# Patient Record
Sex: MALE | Race: WHITE | Employment: OTHER | URBAN - METROPOLITAN AREA
[De-identification: names, ages, dates, MRNs, and addresses within clinical notes are randomized per-mention and may not be internally consistent; named-entity substitution may affect disease eponyms.]

---

## 2023-02-01 ENCOUNTER — APPOINTMENT (OUTPATIENT)
Dept: CT IMAGING | Age: 78
DRG: 390 | End: 2023-02-01
Payer: COMMERCIAL

## 2023-02-01 ENCOUNTER — HOSPITAL ENCOUNTER (INPATIENT)
Age: 78
LOS: 4 days | Discharge: HOME | DRG: 390 | End: 2023-02-05
Attending: EMERGENCY MEDICINE | Admitting: INTERNAL MEDICINE
Payer: COMMERCIAL

## 2023-02-01 ENCOUNTER — APPOINTMENT (OUTPATIENT)
Dept: GENERAL RADIOLOGY | Age: 78
DRG: 390 | End: 2023-02-01
Payer: COMMERCIAL

## 2023-02-01 DIAGNOSIS — K56.609 SBO (SMALL BOWEL OBSTRUCTION) (HCC): Primary | ICD-10-CM

## 2023-02-01 DIAGNOSIS — R11.2 NAUSEA AND VOMITING, UNSPECIFIED VOMITING TYPE: ICD-10-CM

## 2023-02-01 LAB
A/G RATIO: 1.8 (ref 1.1–2.2)
ALBUMIN SERPL-MCNC: 4.6 G/DL (ref 3.4–5)
ALP BLD-CCNC: 56 U/L (ref 40–129)
ALT SERPL-CCNC: 17 U/L (ref 10–40)
ANION GAP SERPL CALCULATED.3IONS-SCNC: 15 MMOL/L (ref 3–16)
AST SERPL-CCNC: 19 U/L (ref 15–37)
BASOPHILS ABSOLUTE: 0 K/UL (ref 0–0.2)
BASOPHILS RELATIVE PERCENT: 0.1 %
BILIRUB SERPL-MCNC: 0.8 MG/DL (ref 0–1)
BILIRUBIN URINE: NEGATIVE
BLOOD, URINE: ABNORMAL
BUN BLDV-MCNC: 25 MG/DL (ref 7–20)
CALCIUM SERPL-MCNC: 9.7 MG/DL (ref 8.3–10.6)
CHLORIDE BLD-SCNC: 97 MMOL/L (ref 99–110)
CLARITY: CLEAR
CO2: 24 MMOL/L (ref 21–32)
COLOR: YELLOW
CREAT SERPL-MCNC: 1.2 MG/DL (ref 0.8–1.3)
EOSINOPHILS ABSOLUTE: 0 K/UL (ref 0–0.6)
EOSINOPHILS RELATIVE PERCENT: 0 %
GFR SERPL CREATININE-BSD FRML MDRD: >60 ML/MIN/{1.73_M2}
GLUCOSE BLD-MCNC: 156 MG/DL (ref 70–99)
GLUCOSE BLD-MCNC: 166 MG/DL (ref 70–99)
GLUCOSE URINE: NEGATIVE MG/DL
HCT VFR BLD CALC: 42.2 % (ref 40.5–52.5)
HEMOGLOBIN: 13.7 G/DL (ref 13.5–17.5)
KETONES, URINE: NEGATIVE MG/DL
LEUKOCYTE ESTERASE, URINE: NEGATIVE
LIPASE: 65 U/L (ref 13–60)
LYMPHOCYTES ABSOLUTE: 0.3 K/UL (ref 1–5.1)
LYMPHOCYTES RELATIVE PERCENT: 3.2 %
MAGNESIUM: 2 MG/DL (ref 1.8–2.4)
MCH RBC QN AUTO: 29 PG (ref 26–34)
MCHC RBC AUTO-ENTMCNC: 32.4 G/DL (ref 31–36)
MCV RBC AUTO: 89.5 FL (ref 80–100)
MICROSCOPIC EXAMINATION: YES
MONOCYTES ABSOLUTE: 0.3 K/UL (ref 0–1.3)
MONOCYTES RELATIVE PERCENT: 3.3 %
NEUTROPHILS ABSOLUTE: 8.8 K/UL (ref 1.7–7.7)
NEUTROPHILS RELATIVE PERCENT: 93.4 %
NITRITE, URINE: NEGATIVE
PDW BLD-RTO: 14.6 % (ref 12.4–15.4)
PERFORMED ON: ABNORMAL
PH UA: 5.5 (ref 5–8)
PLATELET # BLD: 236 K/UL (ref 135–450)
PMV BLD AUTO: 7.8 FL (ref 5–10.5)
POTASSIUM REFLEX MAGNESIUM: 3.4 MMOL/L (ref 3.5–5.1)
PROTEIN UA: 30 MG/DL
RBC # BLD: 4.71 M/UL (ref 4.2–5.9)
RBC UA: NORMAL /HPF (ref 0–4)
SODIUM BLD-SCNC: 136 MMOL/L (ref 136–145)
SPECIFIC GRAVITY UA: 1.01 (ref 1–1.03)
TOTAL PROTEIN: 7.1 G/DL (ref 6.4–8.2)
URINE REFLEX TO CULTURE: ABNORMAL
URINE TYPE: ABNORMAL
UROBILINOGEN, URINE: 0.2 E.U./DL
WBC # BLD: 9.4 K/UL (ref 4–11)
WBC UA: NORMAL /HPF (ref 0–5)

## 2023-02-01 PROCEDURE — 2500000003 HC RX 250 WO HCPCS: Performed by: EMERGENCY MEDICINE

## 2023-02-01 PROCEDURE — 80053 COMPREHEN METABOLIC PANEL: CPT

## 2023-02-01 PROCEDURE — 1200000000 HC SEMI PRIVATE

## 2023-02-01 PROCEDURE — 2580000003 HC RX 258: Performed by: PHYSICIAN ASSISTANT

## 2023-02-01 PROCEDURE — 74177 CT ABD & PELVIS W/CONTRAST: CPT

## 2023-02-01 PROCEDURE — 96374 THER/PROPH/DIAG INJ IV PUSH: CPT

## 2023-02-01 PROCEDURE — 2580000003 HC RX 258: Performed by: NURSE PRACTITIONER

## 2023-02-01 PROCEDURE — 83690 ASSAY OF LIPASE: CPT

## 2023-02-01 PROCEDURE — 93005 ELECTROCARDIOGRAM TRACING: CPT | Performed by: EMERGENCY MEDICINE

## 2023-02-01 PROCEDURE — 6360000002 HC RX W HCPCS: Performed by: EMERGENCY MEDICINE

## 2023-02-01 PROCEDURE — 96375 TX/PRO/DX INJ NEW DRUG ADDON: CPT

## 2023-02-01 PROCEDURE — 2500000003 HC RX 250 WO HCPCS: Performed by: NURSE PRACTITIONER

## 2023-02-01 PROCEDURE — 74018 RADEX ABDOMEN 1 VIEW: CPT

## 2023-02-01 PROCEDURE — 83735 ASSAY OF MAGNESIUM: CPT

## 2023-02-01 PROCEDURE — 81001 URINALYSIS AUTO W/SCOPE: CPT

## 2023-02-01 PROCEDURE — 6360000002 HC RX W HCPCS: Performed by: PHYSICIAN ASSISTANT

## 2023-02-01 PROCEDURE — 6360000002 HC RX W HCPCS: Performed by: NURSE PRACTITIONER

## 2023-02-01 PROCEDURE — 6360000004 HC RX CONTRAST MEDICATION: Performed by: PHYSICIAN ASSISTANT

## 2023-02-01 PROCEDURE — 2580000003 HC RX 258: Performed by: EMERGENCY MEDICINE

## 2023-02-01 PROCEDURE — 85025 COMPLETE CBC W/AUTO DIFF WBC: CPT

## 2023-02-01 PROCEDURE — 99285 EMERGENCY DEPT VISIT HI MDM: CPT

## 2023-02-01 RX ORDER — ACETAMINOPHEN 650 MG/1
650 SUPPOSITORY RECTAL EVERY 6 HOURS PRN
Status: DISCONTINUED | OUTPATIENT
Start: 2023-02-01 | End: 2023-02-05 | Stop reason: HOSPADM

## 2023-02-01 RX ORDER — SODIUM CHLORIDE 0.9 % (FLUSH) 0.9 %
5-40 SYRINGE (ML) INJECTION EVERY 12 HOURS SCHEDULED
Status: DISCONTINUED | OUTPATIENT
Start: 2023-02-01 | End: 2023-02-05 | Stop reason: HOSPADM

## 2023-02-01 RX ORDER — 0.9 % SODIUM CHLORIDE 0.9 %
1000 INTRAVENOUS SOLUTION INTRAVENOUS ONCE
Status: COMPLETED | OUTPATIENT
Start: 2023-02-01 | End: 2023-02-02

## 2023-02-01 RX ORDER — SILDENAFIL 100 MG/1
TABLET, FILM COATED ORAL
COMMUNITY
Start: 2022-12-05

## 2023-02-01 RX ORDER — INSULIN LISPRO 100 [IU]/ML
0-4 INJECTION, SOLUTION INTRAVENOUS; SUBCUTANEOUS EVERY 4 HOURS
Status: DISCONTINUED | OUTPATIENT
Start: 2023-02-01 | End: 2023-02-04

## 2023-02-01 RX ORDER — ONDANSETRON 2 MG/ML
4 INJECTION INTRAMUSCULAR; INTRAVENOUS ONCE
Status: COMPLETED | OUTPATIENT
Start: 2023-02-01 | End: 2023-02-01

## 2023-02-01 RX ORDER — DOXAZOSIN 8 MG/1
TABLET ORAL
COMMUNITY
Start: 2023-01-20

## 2023-02-01 RX ORDER — SODIUM CHLORIDE 9 MG/ML
INJECTION, SOLUTION INTRAVENOUS PRN
Status: DISCONTINUED | OUTPATIENT
Start: 2023-02-01 | End: 2023-02-05 | Stop reason: HOSPADM

## 2023-02-01 RX ORDER — FLUTICASONE PROPIONATE 50 MCG
1 SPRAY, SUSPENSION (ML) NASAL DAILY PRN
COMMUNITY

## 2023-02-01 RX ORDER — VALSARTAN 320 MG/1
TABLET ORAL
COMMUNITY
Start: 2022-09-06

## 2023-02-01 RX ORDER — SODIUM CHLORIDE AND POTASSIUM CHLORIDE 300; 900 MG/100ML; MG/100ML
INJECTION, SOLUTION INTRAVENOUS CONTINUOUS
Status: DISCONTINUED | OUTPATIENT
Start: 2023-02-01 | End: 2023-02-03

## 2023-02-01 RX ORDER — MORPHINE SULFATE 4 MG/ML
4 INJECTION, SOLUTION INTRAMUSCULAR; INTRAVENOUS ONCE
Status: COMPLETED | OUTPATIENT
Start: 2023-02-01 | End: 2023-02-01

## 2023-02-01 RX ORDER — ACETAMINOPHEN 325 MG/1
650 TABLET ORAL EVERY 6 HOURS PRN
Status: DISCONTINUED | OUTPATIENT
Start: 2023-02-01 | End: 2023-02-05 | Stop reason: HOSPADM

## 2023-02-01 RX ORDER — SODIUM CHLORIDE 0.9 % (FLUSH) 0.9 %
5-40 SYRINGE (ML) INJECTION PRN
Status: DISCONTINUED | OUTPATIENT
Start: 2023-02-01 | End: 2023-02-05 | Stop reason: HOSPADM

## 2023-02-01 RX ORDER — DEXTROSE MONOHYDRATE 100 MG/ML
INJECTION, SOLUTION INTRAVENOUS CONTINUOUS PRN
Status: DISCONTINUED | OUTPATIENT
Start: 2023-02-01 | End: 2023-02-05 | Stop reason: HOSPADM

## 2023-02-01 RX ORDER — BLOOD SUGAR DIAGNOSTIC
STRIP MISCELLANEOUS
COMMUNITY
Start: 2023-01-20

## 2023-02-01 RX ORDER — LEVOTHYROXINE SODIUM 0.12 MG/1
TABLET ORAL
COMMUNITY
Start: 2023-01-05

## 2023-02-01 RX ORDER — HYDROCHLOROTHIAZIDE 25 MG/1
TABLET ORAL
COMMUNITY
Start: 2022-09-06

## 2023-02-01 RX ORDER — LABETALOL HYDROCHLORIDE 5 MG/ML
20 INJECTION, SOLUTION INTRAVENOUS ONCE
Status: COMPLETED | OUTPATIENT
Start: 2023-02-01 | End: 2023-02-01

## 2023-02-01 RX ORDER — DILTIAZEM HYDROCHLORIDE 240 MG/1
240 CAPSULE, COATED, EXTENDED RELEASE ORAL DAILY
Status: DISCONTINUED | OUTPATIENT
Start: 2023-02-02 | End: 2023-02-03

## 2023-02-01 RX ORDER — VALSARTAN 80 MG/1
320 TABLET ORAL DAILY
Status: DISCONTINUED | OUTPATIENT
Start: 2023-02-02 | End: 2023-02-05 | Stop reason: HOSPADM

## 2023-02-01 RX ORDER — DOXAZOSIN 2 MG/1
8 TABLET ORAL DAILY
Status: DISCONTINUED | OUTPATIENT
Start: 2023-02-02 | End: 2023-02-03

## 2023-02-01 RX ORDER — LABETALOL HYDROCHLORIDE 5 MG/ML
10 INJECTION, SOLUTION INTRAVENOUS EVERY 4 HOURS PRN
Status: DISCONTINUED | OUTPATIENT
Start: 2023-02-01 | End: 2023-02-05 | Stop reason: HOSPADM

## 2023-02-01 RX ORDER — DILTIAZEM HYDROCHLORIDE 180 MG/1
240 CAPSULE, EXTENDED RELEASE ORAL DAILY
Status: ON HOLD | COMMUNITY
Start: 2018-07-19 | End: 2023-02-03 | Stop reason: CLARIF

## 2023-02-01 RX ORDER — POLYETHYLENE GLYCOL 3350 17 G/17G
17 POWDER, FOR SOLUTION ORAL DAILY PRN
Status: DISCONTINUED | OUTPATIENT
Start: 2023-02-01 | End: 2023-02-05 | Stop reason: HOSPADM

## 2023-02-01 RX ORDER — 0.9 % SODIUM CHLORIDE 0.9 %
1000 INTRAVENOUS SOLUTION INTRAVENOUS ONCE
Status: COMPLETED | OUTPATIENT
Start: 2023-02-01 | End: 2023-02-01

## 2023-02-01 RX ORDER — PROCHLORPERAZINE EDISYLATE 5 MG/ML
10 INJECTION INTRAMUSCULAR; INTRAVENOUS EVERY 6 HOURS PRN
Status: DISCONTINUED | OUTPATIENT
Start: 2023-02-01 | End: 2023-02-05 | Stop reason: HOSPADM

## 2023-02-01 RX ORDER — POTASSIUM CHLORIDE 20 MEQ/1
TABLET, EXTENDED RELEASE ORAL
COMMUNITY
Start: 2010-10-06

## 2023-02-01 RX ORDER — ASPIRIN 81 MG/1
81 TABLET ORAL DAILY
COMMUNITY

## 2023-02-01 RX ORDER — LEVOTHYROXINE SODIUM 0.12 MG/1
125 TABLET ORAL DAILY
Status: DISCONTINUED | OUTPATIENT
Start: 2023-02-02 | End: 2023-02-05 | Stop reason: HOSPADM

## 2023-02-01 RX ADMIN — IOPAMIDOL 75 ML: 755 INJECTION, SOLUTION INTRAVENOUS at 18:56

## 2023-02-01 RX ADMIN — SODIUM CHLORIDE 1000 ML: 9 INJECTION, SOLUTION INTRAVENOUS at 21:02

## 2023-02-01 RX ADMIN — SODIUM CHLORIDE, PRESERVATIVE FREE 10 ML: 5 INJECTION INTRAVENOUS at 23:16

## 2023-02-01 RX ADMIN — POTASSIUM CHLORIDE AND SODIUM CHLORIDE: 900; 300 INJECTION, SOLUTION INTRAVENOUS at 23:30

## 2023-02-01 RX ADMIN — ONDANSETRON 4 MG: 2 INJECTION INTRAMUSCULAR; INTRAVENOUS at 20:56

## 2023-02-01 RX ADMIN — HYDROMORPHONE HYDROCHLORIDE 0.5 MG: 0.5 INJECTION, SOLUTION INTRAMUSCULAR; INTRAVENOUS; SUBCUTANEOUS at 20:57

## 2023-02-01 RX ADMIN — HYDROMORPHONE HYDROCHLORIDE 0.5 MG: 1 INJECTION, SOLUTION INTRAMUSCULAR; INTRAVENOUS; SUBCUTANEOUS at 23:15

## 2023-02-01 RX ADMIN — LABETALOL HYDROCHLORIDE 10 MG: 5 INJECTION INTRAVENOUS at 23:20

## 2023-02-01 RX ADMIN — MORPHINE SULFATE 4 MG: 4 INJECTION, SOLUTION INTRAMUSCULAR; INTRAVENOUS at 18:46

## 2023-02-01 RX ADMIN — Medication 10 ML: at 23:21

## 2023-02-01 RX ADMIN — SODIUM CHLORIDE 1000 ML: 9 INJECTION, SOLUTION INTRAVENOUS at 18:13

## 2023-02-01 RX ADMIN — LABETALOL HYDROCHLORIDE 20 MG: 5 INJECTION INTRAVENOUS at 20:55

## 2023-02-01 ASSESSMENT — PAIN DESCRIPTION - ORIENTATION
ORIENTATION: RIGHT;LEFT;MID
ORIENTATION: MID
ORIENTATION: RIGHT;LEFT;MID

## 2023-02-01 ASSESSMENT — PAIN DESCRIPTION - LOCATION
LOCATION: ABDOMEN

## 2023-02-01 ASSESSMENT — PAIN SCALES - GENERAL
PAINLEVEL_OUTOF10: 2
PAINLEVEL_OUTOF10: 4
PAINLEVEL_OUTOF10: 5
PAINLEVEL_OUTOF10: 8
PAINLEVEL_OUTOF10: 4

## 2023-02-01 ASSESSMENT — PAIN DESCRIPTION - DESCRIPTORS
DESCRIPTORS: ACHING;STABBING;SQUEEZING
DESCRIPTORS: STABBING
DESCRIPTORS: STABBING
DESCRIPTORS: ACHING;CRAMPING

## 2023-02-01 ASSESSMENT — LIFESTYLE VARIABLES: HOW MANY STANDARD DRINKS CONTAINING ALCOHOL DO YOU HAVE ON A TYPICAL DAY: PATIENT DOES NOT DRINK

## 2023-02-01 ASSESSMENT — PAIN - FUNCTIONAL ASSESSMENT
PAIN_FUNCTIONAL_ASSESSMENT: NONE - DENIES PAIN
PAIN_FUNCTIONAL_ASSESSMENT: 0-10

## 2023-02-02 ENCOUNTER — APPOINTMENT (OUTPATIENT)
Dept: GENERAL RADIOLOGY | Age: 78
DRG: 390 | End: 2023-02-02
Payer: COMMERCIAL

## 2023-02-02 PROBLEM — E11.65 HYPERGLYCEMIA DUE TO TYPE 2 DIABETES MELLITUS (HCC): Status: ACTIVE | Noted: 2023-02-02

## 2023-02-02 PROBLEM — I10 HYPERTENSION: Status: ACTIVE | Noted: 2023-02-02

## 2023-02-02 LAB
A/G RATIO: 2.4 (ref 1.1–2.2)
ALBUMIN SERPL-MCNC: 4.3 G/DL (ref 3.4–5)
ALP BLD-CCNC: 50 U/L (ref 40–129)
ALT SERPL-CCNC: 16 U/L (ref 10–40)
ANION GAP SERPL CALCULATED.3IONS-SCNC: 9 MMOL/L (ref 3–16)
APTT: 27.8 SEC (ref 23–34.3)
AST SERPL-CCNC: 16 U/L (ref 15–37)
BASOPHILS ABSOLUTE: 0 K/UL (ref 0–0.2)
BASOPHILS RELATIVE PERCENT: 0.3 %
BILIRUB SERPL-MCNC: 0.6 MG/DL (ref 0–1)
BUN BLDV-MCNC: 26 MG/DL (ref 7–20)
CALCIUM SERPL-MCNC: 9.5 MG/DL (ref 8.3–10.6)
CHLORIDE BLD-SCNC: 106 MMOL/L (ref 99–110)
CO2: 26 MMOL/L (ref 21–32)
CREAT SERPL-MCNC: 1.3 MG/DL (ref 0.8–1.3)
EKG ATRIAL RATE: 81 BPM
EKG DIAGNOSIS: NORMAL
EKG P AXIS: 64 DEGREES
EKG P-R INTERVAL: 196 MS
EKG Q-T INTERVAL: 420 MS
EKG QRS DURATION: 110 MS
EKG QTC CALCULATION (BAZETT): 487 MS
EKG R AXIS: -10 DEGREES
EKG T AXIS: 51 DEGREES
EKG VENTRICULAR RATE: 81 BPM
EOSINOPHILS ABSOLUTE: 0 K/UL (ref 0–0.6)
EOSINOPHILS RELATIVE PERCENT: 0.1 %
GFR SERPL CREATININE-BSD FRML MDRD: 57 ML/MIN/{1.73_M2}
GLUCOSE BLD-MCNC: 108 MG/DL (ref 70–99)
GLUCOSE BLD-MCNC: 110 MG/DL (ref 70–99)
GLUCOSE BLD-MCNC: 127 MG/DL (ref 70–99)
GLUCOSE BLD-MCNC: 128 MG/DL (ref 70–99)
GLUCOSE BLD-MCNC: 134 MG/DL (ref 70–99)
GLUCOSE BLD-MCNC: 143 MG/DL (ref 70–99)
HCT VFR BLD CALC: 39 % (ref 40.5–52.5)
HEMOGLOBIN: 13 G/DL (ref 13.5–17.5)
INR BLD: 1.11 (ref 0.87–1.14)
LYMPHOCYTES ABSOLUTE: 0.5 K/UL (ref 1–5.1)
LYMPHOCYTES RELATIVE PERCENT: 6.5 %
MCH RBC QN AUTO: 30 PG (ref 26–34)
MCHC RBC AUTO-ENTMCNC: 33.5 G/DL (ref 31–36)
MCV RBC AUTO: 89.6 FL (ref 80–100)
MONOCYTES ABSOLUTE: 0.6 K/UL (ref 0–1.3)
MONOCYTES RELATIVE PERCENT: 7.7 %
NEUTROPHILS ABSOLUTE: 7.1 K/UL (ref 1.7–7.7)
NEUTROPHILS RELATIVE PERCENT: 85.4 %
PDW BLD-RTO: 14.6 % (ref 12.4–15.4)
PERFORMED ON: ABNORMAL
PLATELET # BLD: 210 K/UL (ref 135–450)
PMV BLD AUTO: 7.1 FL (ref 5–10.5)
POTASSIUM REFLEX MAGNESIUM: 4 MMOL/L (ref 3.5–5.1)
PROTHROMBIN TIME: 14.2 SEC (ref 11.7–14.5)
RBC # BLD: 4.35 M/UL (ref 4.2–5.9)
SODIUM BLD-SCNC: 141 MMOL/L (ref 136–145)
TOTAL PROTEIN: 6.1 G/DL (ref 6.4–8.2)
WBC # BLD: 8.4 K/UL (ref 4–11)

## 2023-02-02 PROCEDURE — 36415 COLL VENOUS BLD VENIPUNCTURE: CPT

## 2023-02-02 PROCEDURE — 74022 RADEX COMPL AQT ABD SERIES: CPT

## 2023-02-02 PROCEDURE — C9113 INJ PANTOPRAZOLE SODIUM, VIA: HCPCS | Performed by: NURSE PRACTITIONER

## 2023-02-02 PROCEDURE — APPSS45 APP SPLIT SHARED TIME 31-45 MINUTES: Performed by: CLINICAL NURSE SPECIALIST

## 2023-02-02 PROCEDURE — 2580000003 HC RX 258: Performed by: NURSE PRACTITIONER

## 2023-02-02 PROCEDURE — 80053 COMPREHEN METABOLIC PANEL: CPT

## 2023-02-02 PROCEDURE — 93010 ELECTROCARDIOGRAM REPORT: CPT | Performed by: INTERNAL MEDICINE

## 2023-02-02 PROCEDURE — 6360000002 HC RX W HCPCS: Performed by: NURSE PRACTITIONER

## 2023-02-02 PROCEDURE — 85025 COMPLETE CBC W/AUTO DIFF WBC: CPT

## 2023-02-02 PROCEDURE — APPNB60 APP NON BILLABLE TIME 46-60 MINS: Performed by: CLINICAL NURSE SPECIALIST

## 2023-02-02 PROCEDURE — 2500000003 HC RX 250 WO HCPCS: Performed by: NURSE PRACTITIONER

## 2023-02-02 PROCEDURE — 85730 THROMBOPLASTIN TIME PARTIAL: CPT

## 2023-02-02 PROCEDURE — 85610 PROTHROMBIN TIME: CPT

## 2023-02-02 PROCEDURE — 99222 1ST HOSP IP/OBS MODERATE 55: CPT | Performed by: SURGERY

## 2023-02-02 PROCEDURE — 1200000000 HC SEMI PRIVATE

## 2023-02-02 RX ORDER — PANTOPRAZOLE SODIUM 40 MG/10ML
40 INJECTION, POWDER, LYOPHILIZED, FOR SOLUTION INTRAVENOUS DAILY
Status: DISCONTINUED | OUTPATIENT
Start: 2023-02-02 | End: 2023-02-05 | Stop reason: HOSPADM

## 2023-02-02 RX ADMIN — PANTOPRAZOLE SODIUM 40 MG: 40 INJECTION, POWDER, FOR SOLUTION INTRAVENOUS at 09:31

## 2023-02-02 RX ADMIN — POTASSIUM CHLORIDE AND SODIUM CHLORIDE: 900; 300 INJECTION, SOLUTION INTRAVENOUS at 21:41

## 2023-02-02 RX ADMIN — POTASSIUM CHLORIDE AND SODIUM CHLORIDE: 900; 300 INJECTION, SOLUTION INTRAVENOUS at 09:48

## 2023-02-02 RX ADMIN — LABETALOL HYDROCHLORIDE 10 MG: 5 INJECTION INTRAVENOUS at 09:32

## 2023-02-02 RX ADMIN — SODIUM CHLORIDE, PRESERVATIVE FREE 10 ML: 5 INJECTION INTRAVENOUS at 09:32

## 2023-02-02 RX ADMIN — LABETALOL HYDROCHLORIDE 10 MG: 5 INJECTION INTRAVENOUS at 16:16

## 2023-02-02 ASSESSMENT — PAIN SCALES - GENERAL
PAINLEVEL_OUTOF10: 0
PAINLEVEL_OUTOF10: 0

## 2023-02-02 NOTE — PLAN OF CARE
Problem: Pain  Goal: Verbalizes/displays adequate comfort level or baseline comfort level  2/2/2023 1457 by Khushbu Martinez RN  Flowsheets (Taken 2/2/2023 1457)  Verbalizes/displays adequate comfort level or baseline comfort level:   Encourage patient to monitor pain and request assistance   Assess pain using appropriate pain scale   Administer analgesics based on type and severity of pain and evaluate response   Implement non-pharmacological measures as appropriate and evaluate response  2/2/2023 0258 by Raven Garnica RN  Outcome: Progressing     Problem: Safety - Adult  Goal: Free from fall injury  2/2/2023 1457 by Khushbu Martinez RN  Flowsheets (Taken 2/2/2023 1457)  Free From Fall Injury:   Instruct family/caregiver on patient safety   Based on caregiver fall risk screen, instruct family/caregiver to ask for assistance with transferring infant if caregiver noted to have fall risk factors  2/2/2023 0258 by Raven Garnica RN  Outcome: Progressing

## 2023-02-02 NOTE — FLOWSHEET NOTE
02/01/23 4513   Assessment   Charting Type Admission   Psychosocial   Psychosocial (WDL) WDL   Neurological   Neuro (WDL) WDL   Blake Coma Scale   Eye Opening 4   Best Verbal Response 5   Best Motor Response 6   Blake Coma Scale Score 15   HEENT (Head, Ears, Eyes, Nose, & Throat)   HEENT (WDL) X   Right Eye Glasses; Impaired vision   Left Eye Glasses; Impaired vision   Respiratory   Respiratory (WDL) WDL   Respiratory Pattern Regular   Respiratory Depth Normal   Respiratory Quality/Effort Unlabored   Chest Assessment Chest expansion symmetrical;Trachea midline   L Breath Sounds Clear   R Breath Sounds Clear   Cardiac   Cardiac (WDL) WDL   Gastrointestinal   Abdominal (WDL) X  (hx of a cholectomy)   RUQ Bowel Sounds Hypoactive   LUQ Bowel Sounds Hypoactive   RLQ Bowel Sounds Hypoactive   LLQ Bowel Sounds Hypoactive   GI Symptoms Diarrhea   Tenderness Tenderness   NG/OG/NJ/NE Tube Nasogastric 18 fr Left nostril   Placement Date/Time: 02/01/23 2113   Present on Admission/Arrival: No  Inserted by: RADHA BURGOS  Insertion attempts: 1  Tube Type: Nasogastric  Tube Size (fr): 18 fr  Tube Location: Left nostril  External Catheter Length (cm): 70 cm   Surrounding Skin Clean, dry & intact   Securement device Tape   Status Suction-low intermittent   Genitourinary   Genitourinary (WDL) WDL   Peripheral Vascular   Peripheral Vascular (WDL) WDL   Dual Clinician Skin Assessment   Dual Skin Assessment (4 Eyes) WDL   Second Clinical  (First and Last Name) RADHA Sifuentes   Skin Integumentary    Skin Integumentary (WDL) WDL   Musculoskeletal   Musculoskeletal (WDL) WDL

## 2023-02-02 NOTE — ED PROVIDER NOTES
[address] ENCOUNTER        Pt Name: Gaurav Stewart  MRN: [id number]  [place] 1945  Date of evaluation: 2/1/2023  Provider: JOSÉ MIGUEL Cooper  PCP: Willie Carmen MD  Note Started: 7:11 PM EST 2/1/23       I have seen and evaluated this patient with my supervising physician Erich Taylor MD.      [address] [place] [address]       Chief Complaint   Patient presents with    Abdominal Pain     Per squad report pt c/o abdominal spasms that started around midnight. Pt given 4mg of zofran and 50mcg fentanyl in route to the ED. Pt reports he ate cabbage for dinner and at midnight he vomited his entire meal up. Pt reports no BM in 24 hours. Pt states he has been unable to take any of his daily medications. Emesis       HISTORY OF PRESENT ILLNESS: 1 or more Elements     History from : Patient        Gaurav Stewart is a 69 y.o. male who presents complaining of abdominal pain, nausea, vomiting, and constipation. The patient states he ate cabbage for dinner last night and around midnight he woke up with intense abdominal cramping. He was able to fall back to sleep however around 4:00 in the morning he woke up again with worsening abdominal pain, he then vomited profusely and states it looked like he vomited up his entire dinner. Since then he has been unable to keep anything down. He also states he has been constipated for the past 24 hours. He has been unable to take any of his daily medications today. He states he received fentanyl and Zofran in route by EMS and has ultimately had good relief of his symptoms for now. Currently rates pain as 4/10. Nursing Notes were all reviewed and agreed with or any disagreements were addressed in the HPI. REVIEW OF SYSTEMS :      Review of Systems    Positives and Pertinent negatives as per HPI.      SURGICAL HISTORY     Past Surgical History:   Procedure Laterality Date    ABDOMEN SURGERY      COLONOSCOPY      DILATATION, ESOPHAGUS CURRENTMEDICATIONS       Current Discharge Medication List        CONTINUE these medications which have NOT CHANGED    Details   dilTIAZem (TIAZAC) 180 MG extended release capsule Take 180 mg by mouth daily      blood glucose test strips (ACCU-CHEK MARLON PLUS) strip Use to check blood sugar once daily  DX: E11.9      hydroCHLOROthiazide (HYDRODIURIL) 25 MG tablet TAKE 1 TABLET BY MOUTH EVERY DAY IN THE MORNING      potassium chloride (KLOR-CON M20) 20 MEQ extended release tablet TAKE 1 TABLET BY MOUTH TWICE A DAY      sildenafil (VIAGRA) 100 MG tablet TAKE 1/2 TABLET BY MOUTH DAILY AS NEEDED      valsartan (DIOVAN) 320 MG tablet TAKE 1 TABLET BY MOUTH EVERY DAY      aspirin 81 MG EC tablet Take 81 mg by mouth daily      doxazosin (CARDURA) 8 MG tablet       fluticasone (FLONASE) 50 MCG/ACT nasal spray 1 spray by Nasal route daily as needed      levothyroxine (SYNTHROID) 125 MCG tablet TAKE 1 TABLET BY MOUTH EVERY DAY             ALLERGIES     Latex and Sulfa antibiotics    FAMILYHISTORY     History reviewed. No pertinent family history. SOCIAL HISTORY       Social History     Tobacco Use    Smoking status: Former     Types: Cigarettes    Smokeless tobacco: Never   Substance Use Topics    Alcohol use: Not Currently       SCREENINGS        Diamond Coma Scale  Eye Opening: Spontaneous  Best Verbal Response: Oriented  Best Motor Response: Obeys commands  Diamond Coma Scale Score: 15                CIWA Assessment  BP: (!) 159/84  Heart Rate: 66           PHYSICAL EXAM  1 or more Elements     ED Triage Vitals [02/01/23 1706]   BP Temp Temp Source Heart Rate Resp SpO2 Height Weight   (!) 200/93 97.9 °F (36.6 °C) Oral 84 17 97 % 6' 1\" (1.854 m) 222 lb (100.7 kg)       Physical Exam  Vitals and nursing note reviewed. Constitutional:       Appearance: Normal appearance. He is obese. He is not diaphoretic. HENT:      Head: Normocephalic and atraumatic.       Nose: Nose normal.      Mouth/Throat:      Mouth: Mucous membranes are moist.   Eyes:      General:         Right eye: No discharge. Left eye: No discharge. Extraocular Movements: Extraocular movements intact. Pupils: Pupils are equal, round, and reactive to light. Cardiovascular:      Rate and Rhythm: Normal rate and regular rhythm. Pulses: Normal pulses. Heart sounds: Normal heart sounds. No murmur heard. No friction rub. No gallop. Pulmonary:      Effort: Pulmonary effort is normal. No respiratory distress. Breath sounds: Normal breath sounds. No stridor. No wheezing, rhonchi or rales. Abdominal:      General: Abdomen is flat. Palpations: Abdomen is soft. Tenderness: There is generalized abdominal tenderness. There is no guarding or rebound. Comments: Decreased bowel sounds in lower quadrants   Musculoskeletal:         General: Normal range of motion. Cervical back: Normal range of motion and neck supple. Skin:     General: Skin is warm and dry. Coloration: Skin is not pale. Neurological:      Mental Status: He is alert and oriented to person, place, and time.    Psychiatric:         Mood and Affect: Mood normal.         Behavior: Behavior normal.           DIAGNOSTIC RESULTS   LABS:    Labs Reviewed   CBC WITH AUTO DIFFERENTIAL - Abnormal; Notable for the following components:       Result Value    Neutrophils Absolute 8.8 (*)     Lymphocytes Absolute 0.3 (*)     All other components within normal limits   COMPREHENSIVE METABOLIC PANEL W/ REFLEX TO MG FOR LOW K - Abnormal; Notable for the following components:    Potassium reflex Magnesium 3.4 (*)     Chloride 97 (*)     Glucose 166 (*)     BUN 25 (*)     All other components within normal limits   LIPASE - Abnormal; Notable for the following components:    Lipase 65.0 (*)     All other components within normal limits   URINALYSIS WITH REFLEX TO CULTURE - Abnormal; Notable for the following components:    Blood, Urine TRACE-LYSED (*) Protein, UA 30 (*)     All other components within normal limits   POCT GLUCOSE - Abnormal; Notable for the following components:    POC Glucose 156 (*)     All other components within normal limits   MAGNESIUM   MICROSCOPIC URINALYSIS   COMPREHENSIVE METABOLIC PANEL W/ REFLEX TO MG FOR LOW K   CBC WITH AUTO DIFFERENTIAL   PROTIME-INR   APTT   POCT GLUCOSE   POCT GLUCOSE       When ordered only abnormal lab results are displayed. All other labs were within normal range or not returned as of this dictation. EKG: When ordered, EKG's are interpreted by the Emergency Department Physician in the absence of a cardiologist.  Please see their note for interpretation of EKG. RADIOLOGY:   Non-plain film images such as CT, Ultrasound and MRI are read by the radiologist. Plain radiographic images are visualized and preliminarily interpreted by the ED Provider with the below findings:    CT with concerns of small bowel obstruction    Interpretation per the Radiologist below, if available at the time of this note:    XR ABDOMEN (KUB) (SINGLE AP VIEW)   Final Result   Enteric tube tip and side hole project at the gastric body. CT ABDOMEN PELVIS W IV CONTRAST Additional Contrast? None   Final Result   Findings compatible with small-bowel obstruction, with a sharp zone of   transition along the ventral abdominal wall, almost certainly secondary to an   adhesion. No results found. No results found. PROCEDURES   Unless otherwise noted below, none     Procedures    CRITICAL CARE TIME (.cctime)   None    PAST MEDICAL HISTORY      has a past medical history of Cancer ([address] Diabetes mellitus ([address] Hyperlipidemia, Hypertension, and Thyroid disease. Chronic Conditions affecting Care: History of bowel resection.     EMERGENCY DEPARTMENT COURSE and DIFFERENTIAL DIAGNOSIS/MDM:   Vitals:    Vitals:    02/02/23 0037 02/02/23 0042 02/02/23 0047 02/02/23 0052   BP: (!) 162/74 (!) 159/71 (!) 171/79 (!) 159/84 Pulse:       Resp:       Temp:       TempSrc:       SpO2:       Weight:       Height:           Patient was given the following medications:  Medications   0.9 % sodium chloride bolus (1,000 mLs IntraVENous New Bag 2/1/23 2102)   0.9% NaCl with KCl 40 mEq infusion ( IntraVENous New Bag 2/1/23 2330)   labetalol (NORMODYNE;TRANDATE) injection 10 mg (10 mg IntraVENous Given 2/1/23 2320)   dilTIAZem (CARDIZEM CD) extended release capsule 240 mg (has no administration in time range)   doxazosin (CARDURA) tablet 8 mg (has no administration in time range)   levothyroxine (SYNTHROID) tablet 125 mcg (has no administration in time range)   valsartan (DIOVAN) tablet 320 mg (has no administration in time range)   glucose chewable tablet 16 g (has no administration in time range)   dextrose bolus 10% 125 mL (has no administration in time range)     Or   dextrose bolus 10% 250 mL (has no administration in time range)   glucagon (rDNA) injection 1 mg (has no administration in time range)   dextrose 10 % infusion (has no administration in time range)   sodium chloride flush 0.9 % injection 5-40 mL (10 mLs IntraVENous Given 2/1/23 2316)   sodium chloride flush 0.9 % injection 5-40 mL (10 mLs IntraVENous Given 2/1/23 2321)   0.9 % sodium chloride infusion (has no administration in time range)   polyethylene glycol (GLYCOLAX) packet 17 g (has no administration in time range)   acetaminophen (TYLENOL) tablet 650 mg (has no administration in time range)     Or   acetaminophen (TYLENOL) suppository 650 mg (has no administration in time range)   prochlorperazine (COMPAZINE) injection 10 mg (has no administration in time range)   insulin lispro (HUMALOG) injection vial 0-4 Units (0 Units SubCUTAneous Not Given 2/1/23 2331)   HYDROmorphone (DILAUDID) injection 0.5 mg (0.5 mg IntraVENous Given 2/1/23 2315)   0.9 % sodium chloride bolus (0 mLs IntraVENous Stopped 2/1/23 1920)   morphine sulfate (PF) injection 4 mg (4 mg IntraVENous Given 2/1/23 1846)   iopamidol (ISOVUE-370) 76 % injection 75 mL (75 mLs IntraVENous Given 2/1/23 1856)   HYDROmorphone (DILAUDID) injection 0.5 mg (0.5 mg IntraVENous Given 2/1/23 2057)   ondansetron (ZOFRAN) injection 4 mg (4 mg IntraVENous Given 2/1/23 2056)   labetalol (NORMODYNE;TRANDATE) injection 20 mg (20 mg IntraVENous Given 2/1/23 2055)             Is this patient to be included in the SEP-1 Core Measure due to severe sepsis or septic shock?   No   Exclusion criteria - the patient is NOT to be included for SEP-1 Core Measure due to:  2+ SIRS criteria are not met    CONSULTS: (Who and What was discussed)  IP CONSULT TO GENERAL SURGERY  IP CONSULT TO GENERAL SURGERY              CC/HPI Summary, DDx, ED Course, and Reassessment: Patient presents to the emergency department today complaining of abdominal pain, nausea, vomiting, constipation.  Differential diagnosis includes small bowel obstruction, diverticulitis, gastroenteritis, cholecystitis.    Patient's vital signs are stable at triage.  Physical exam is notable for slightly decreased bowel sounds in left lower quadrant and right lower quadrant compared to upper quadrants.  He has some mild tenderness with palpation of left lower quadrant.    Work-up results include:  CBC without evidence of leukocytosis or acute anemia  CMP with sodium of 3.4, otherwise renal function maintained and no transaminitis.  Lipase is slightly elevated at 65.  Magnesium is 2.0  EKG was interpreted by attending physician, normal sinus rhythm but prolonged QTc at 487.  CT abdomen pelvis with concerning findings of small bowel obstruction therefore general surgery was consulted.    I spoke with Dr. Bernard with general surgery regarding the CT imaging results.  He will consult on inpatient team.  Otherwise patient will be made n.p.o. given maintenance fluids and NG tube will be placed.  Hospitalist was consulted at this time for admission.    Patient was seen and evaluated by myself  and attending physician who agrees with all work-up, treatment, and disposition decisions. Patient is in agreement treatment plan. Disposition Considerations (include 1 Tests not done, Shared Decision Making, Pt Expectation of Test or Tx.): The patient requires admission for further evaluation and treatment of small bowel obstruction.       Results for orders placed or performed during the hospital encounter of 02/01/23   CBC with Auto Differential   Result Value Ref Range    WBC 9.4 4.0 - 11.0 K/uL    RBC 4.71 4.20 - 5.90 M/uL    Hemoglobin 13.7 13.5 - 17.5 g/dL    Hematocrit 42.2 40.5 - 52.5 %    MCV 89.5 80.0 - 100.0 fL    MCH 29.0 26.0 - 34.0 pg    MCHC 32.4 31.0 - 36.0 g/dL    RDW 14.6 12.4 - 15.4 %    Platelets 509 817 - 289 K/uL    MPV 7.8 5.0 - 10.5 fL    Neutrophils % 93.4 %    Lymphocytes % 3.2 %    Monocytes % 3.3 %    Eosinophils % 0.0 %    Basophils % 0.1 %    Neutrophils Absolute 8.8 (H) 1.7 - 7.7 K/uL    Lymphocytes Absolute 0.3 (L) 1.0 - 5.1 K/uL    Monocytes Absolute 0.3 0.0 - 1.3 K/uL    Eosinophils Absolute 0.0 0.0 - 0.6 K/uL    Basophils Absolute 0.0 0.0 - 0.2 K/uL   Comprehensive Metabolic Panel w/ Reflex to MG   Result Value Ref Range    Sodium 136 136 - 145 mmol/L    Potassium reflex Magnesium 3.4 (L) 3.5 - 5.1 mmol/L    Chloride 97 (L) 99 - 110 mmol/L    CO2 24 21 - 32 mmol/L    Anion Gap 15 3 - 16    Glucose 166 (H) 70 - 99 mg/dL    BUN 25 (H) 7 - 20 mg/dL    Creatinine 1.2 0.8 - 1.3 mg/dL    Est, Glom Filt Rate >60 >60    Calcium 9.7 8.3 - 10.6 mg/dL    Total Protein 7.1 6.4 - 8.2 g/dL    Albumin 4.6 3.4 - 5.0 g/dL    Albumin/Globulin Ratio 1.8 1.1 - 2.2    Total Bilirubin 0.8 0.0 - 1.0 mg/dL    Alkaline Phosphatase 56 40 - 129 U/L    ALT 17 10 - 40 U/L    AST 19 15 - 37 U/L   Lipase   Result Value Ref Range    Lipase 65.0 (H) 13.0 - 60.0 U/L   Urinalysis with Reflex to Culture    Specimen: Urine, clean catch   Result Value Ref Range    Color, UA Yellow Straw/Yellow    Clarity, UA Clear Clear    Glucose, Ur Negative Negative mg/dL    Bilirubin Urine Negative Negative    Ketones, Urine Negative Negative mg/dL    Specific Gravity, UA 1.015 1.005 - 1.030    Blood, Urine TRACE-LYSED (A) Negative    pH, UA 5.5 5.0 - 8.0    Protein, UA 30 (A) Negative mg/dL    Urobilinogen, Urine 0.2 <2.0 E.U./dL    Nitrite, Urine Negative Negative    Leukocyte Esterase, Urine Negative Negative    Microscopic Examination YES     Urine Type NotGiven     Urine Reflex to Culture Not Indicated    Magnesium   Result Value Ref Range    Magnesium 2.00 1.80 - 2.40 mg/dL   Microscopic Urinalysis   Result Value Ref Range    WBC, UA None seen 0 - 5 /HPF    RBC, UA 0-2 0 - 4 /HPF   POCT Glucose   Result Value Ref Range    POC Glucose 156 (H) 70 - 99 mg/dl    Performed on ACCU-CHEK    EKG 12 Lead   Result Value Ref Range    Ventricular Rate 81 BPM    Atrial Rate 81 BPM    P-R Interval 196 ms    QRS Duration 110 ms    Q-T Interval 420 ms    QTc Calculation (Bazett) 487 ms    P Axis 64 degrees    R Axis -10 degrees    T Axis 51 degrees    Diagnosis       Normal sinus rhythmProlonged QTAbnormal ECGNo previous ECGs available       I spoke with Dr. Stacey Thomas hospitalist. We discussed the history, physical exam, diagnostics, as well as their emergency department course. Based upon that discussion, we've decided to admit Vania Hernandes for further observation and evaluation of Beto Gómez's   1. SBO (small bowel obstruction) ([address]    2. Nausea and vomiting, unspecified vomiting type    . I am the Primary Clinician of Record. FINAL IMPRESSION      1. SBO (small bowel obstruction) ([address]    2. Nausea and vomiting, unspecified vomiting type          DISPOSITION/PLAN     DISPOSITION Admitted 02/01/2023 09:17:01 PM      PATIENT REFERRED TO:  No follow-up provider specified.     DISCHARGE MEDICATIONS:  Current Discharge Medication List          DISCONTINUED MEDICATIONS:  Current Discharge Medication List                 (Please note that portions of this note were completed with a voice recognition program.  Efforts were made to edit the dictations but occasionally words are mis-transcribed.)    JOSÉ MIGUEL Aguilera (electronically signed)           Mike Aguilera  02/02/23 6630

## 2023-02-02 NOTE — PROGRESS NOTES
Paged hosptialist:    Patient admitted for SBO. Patient is in pain and requesting pain medication. There is nothng ordered.  Thank you, Yonathan Klein Intercostal, Substernal and Supraclavicular

## 2023-02-02 NOTE — ED PROVIDER NOTES
Emergency Department Attending Provider Note  Location: [facility]/MED SURG/ONC  2/1/2023     Patient Identification  Pricila Cardona is a 69 y.o. male      Radha Wells was evaluated in the Emergency Department for abdominal pain and vomiting which has been going on for about 24 hours. Although initial history and physical exam information was obtained by GERARDO/NPP/MD/ (who also dictated a record of this visit), I personally saw the patient and performed a substantive portion of the visit including all aspects of the medical decision making. PHYSICAL EXAM:  Nontoxic-appearing adult male in no acute distress. Normal heart sounds and breath sounds. Abdomen is distended. Hyperactive bowel sounds, generalized tenderness with no rebound rigidity or guarding. EKG Interpretation      Patient seen and evaluated. Relevant records reviewed. MDM:  Elderly male with a history of previous bowel surgery who comes in with abdominal pain and vomiting. Basic laboratory studies are ordered. Patient has also a CT scan ordered. CT scan shows small bowel obstruction. Patient reassessed. Patient was given pain medication but he symptoms still persist.  Blood pressure is elevated as he is not been able to tolerate his home medications. Patient given IV blood pressure medications. Continue to receive pain medication. Also given IV antiemetics. NG tube placed. Patient will need to be admitted to the hospital for further care. Patient expressed understanding and is agreeable to treatment plan. CLINICAL IMPRESSION  1. SBO (small bowel obstruction) ([address]    2. Nausea and vomiting, unspecified vomiting type            I personally saw the patient and independently provided  minutes of non-concurrent critical care out of the total shared critical care time provided.     This chart was generated in part by using Dragon Dictation system and may contain errors related to that system including errors in grammar, punctuation, and spelling, as well as words and phrases that may be inappropriate. If there are any questions or concerns please feel free to contact the dictating provider for clarification. Kaitlin Witt MD  I am the primary clinician of record.    [address] [place] [address]       Kaitlin Witt MD  02/02/23 6366

## 2023-02-02 NOTE — PROGRESS NOTES
Hospitalist Progress Note      PCP: Lamar Escamilla MD    Date of Admission: 2/1/2023    Chief Complaint: Abdominal pain    Hospital Course:      Reyna Zepeda is a 43-year-old male significant past medical history of hypertension, hyperlipidemia, type 2 diabetes controlled with diet, and hypothyroidism who [place] [facility] [place] with complaint of abdominal pain, nausea, and vomiting that began today. He also notes has not had a bowel movement in approximate 24 hours which she endorses a very regular BM schedule. 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 He does not have a colostomy, states has not had any issues since his surgery. Denies any fever at home. His evaluation here included laboratory studies, EKG, KUB, and CT of the abdomen pelvis with IV contrast.  CT showed findings compatible with small bowel obstruction with short sharp zone of transition along the ventral abdominal wall likely secondary to adhesion. An NG tube was placed by the ED. Pertinent abnormal lab values include potassium 3.4, BUN 25, blood sugar 166, and lipase 65. Hospital team consulted to admit this patient for small bowel obstruction. Patient denies prior episode of small bowel obstruction. Subjective:   Patient states he is doing better. NG tube has helped decompress the stomach. No nausea or vomiting.       Medications:  Reviewed    Infusion Medications    0.9% NaCl with KCl 40 mEq 100 mL/hr at 02/02/23 0948    dextrose      sodium chloride       Scheduled Medications    pantoprazole  40 mg IntraVENous Daily    dilTIAZem  240 mg Oral Daily    doxazosin  8 mg Oral Daily    levothyroxine  125 mcg Oral Daily    valsartan  320 mg Oral Daily    sodium chloride flush  5-40 mL IntraVENous 2 times per day    insulin lispro  0-4 Units SubCUTAneous Q4H     PRN Meds: labetalol, glucose, dextrose bolus **OR** dextrose bolus, glucagon (rDNA), dextrose, sodium chloride flush, sodium chloride, polyethylene glycol, acetaminophen **OR** acetaminophen, prochlorperazine, HYDROmorphone      Intake/Output Summary (Last 24 hours) at 2/2/2023 1535  Last data filed at 2/2/2023 0257  Gross per 24 hour   Intake 10 ml   Output --   Net 10 ml       Physical Exam Performed:    BP (!) 175/77   Pulse 61   Temp 98.3 °F (36.8 °C) (Oral)   Resp 16   Ht 6' 1\" (1.854 m)   Wt 223 lb 8 oz (101.4 kg)   SpO2 94%   BMI 29.49 kg/m²     General appearance: No apparent distress, appears stated age and cooperative. HEENT: Pupils equal, round, and reactive to light. Conjunctivae/corneas clear. Neck: Supple, with full range of motion. No jugular venous distention. Trachea midline. Respiratory:  Normal respiratory effort. Clear to auscultation, bilaterally without Rales/Wheezes/Rhonchi. Cardiovascular: Regular rate and rhythm with normal S1/S2 without murmurs, rubs or gallops. Abdomen: Soft, non-tender, non-distended with normal bowel sounds. Musculoskeletal: No clubbing, cyanosis or edema bilaterally. Full range of motion without deformity. Skin: Skin color, texture, turgor normal.  No rashes or lesions. Neurologic:  Neurovascularly intact without any focal sensory/motor deficits.  Cranial nerves: II-XII intact, grossly non-focal.  Psychiatric: Alert and oriented, thought content appropriate, normal insight  Capillary Refill: Brisk, 3 seconds, normal   Peripheral Pulses: +2 palpable, equal bilaterally       Labs:   Recent Labs     02/01/23  1728 02/02/23  0545   WBC 9.4 8.4   HGB 13.7 13.0*   HCT 42.2 39.0*    210     Recent Labs     02/01/23  1728 02/02/23  0545    141   K 3.4* 4.0   CL 97* 106   CO2 24 26   BUN 25* 26*   CREATININE 1.2 1.3   CALCIUM 9.7 9.5     Recent Labs     02/01/23  1728 02/02/23  0545   AST 19 16   ALT 17 16   BILITOT 0.8 0.6   ALKPHOS 56 50     Recent Labs     02/02/23  0545   INR 1.11     No results for input(s): Ovidio Boyle in the last 72 hours. Urinalysis:      Lab Results   Component Value Date/Time    NITRU Negative 02/01/2023 11:00 PM    WBCUA None seen 02/01/2023 11:00 PM    RBCUA 0-2 02/01/2023 11:00 PM    BLOODU TRACE-LYSED 02/01/2023 11:00 PM    SPECGRAV 1.015 02/01/2023 11:00 PM    GLUCOSEU Negative 02/01/2023 11:00 PM       Radiology:  XR ACUTE ABD SERIES CHEST 1 VW   Final Result   Persistent partial small bowel obstruction. No pneumoperitoneum. No acute finding noted in the chest.         XR ABDOMEN (KUB) (SINGLE AP VIEW)   Final Result   Enteric tube tip and side hole project at the gastric body. CT ABDOMEN PELVIS W IV CONTRAST Additional Contrast? None   Final Result   Findings compatible with small-bowel obstruction, with a sharp zone of   transition along the ventral abdominal wall, almost certainly secondary to an   adhesion. XR ABDOMEN (2 VIEWS)    (Results Pending)       IP CONSULT TO GENERAL SURGERY  IP CONSULT TO GENERAL SURGERY    Assessment/Plan:    Active Hospital Problems    Diagnosis     Hyperglycemia due to type 2 diabetes mellitus ([facility] [address] [E11.65]      Priority: Medium    Hypertension [I10]      Priority: Medium    SBO (small bowel obstruction) ([facility] [address] [K56.609]      Priority: Medium     1. Abdominal pain secondary to small bowel obstruction. Patient with transition point. Surgery consulted. Patient NPO. Surgery will trial NG tube clamping. Patient may need surgery if this persists. We will review electrolytes. 2.  Hypertension. We will continue to monitor his blood pressure. Continue valsartan. 3.  FEN. Patient on IV fluids. Patient n.p.o. until small bowel obstruction resolves. Monitor electrolytes and correct as needed. 4.  Type 2 diabetes. We will continue insulin sliding scale. Monitor closely. DVT Prophylaxis: Patient will have SCDs ordered.   Diet: Diet NPO Exceptions are: Ice Chips  Code Status: Full Code  PT/OT Eval Status: Up with assistance PT OT pending    Dispo -home when stable    Appropriate for A1 Discharge Unit: Lilli Miranda MD

## 2023-02-02 NOTE — CONSULTS
Department of General Surgery Consult    PATIENT NAME: Erwin Hyatt   YOB: 1945    ADMISSION DATE: 2/1/2023  5:04 PM      TODAY'S DATE: 2/2/2023    Reason for Consult:  PSBO    Chief Complaint: abd pain, distention    Requesting Physician:  Iona Schwab    HISTORY OF PRESENT ILLNESS:      The patient is a 69 y.o. male who presents with complaints of abdominal pain, distention with associated nausea and vomiting that started 2 nights ago. He reports that he ate cabbage dish for dinner, and while he normally takes a miralax in the morning if he is going to eat this, he did not and feels this contributed to his symptoms. He reports that he normally grinds his food up well due to his extensive abdominal surgical history, listed below. This is his first episode of bowel obstruction. He states that after the ngt was inserted, he immediately felt much better. This morning he reports no abd pain or bloating, and has had 2 BMs. He also reports he is passing flatus.      Past Medical History:        Diagnosis Date    Cancer ([address]     Diabetes mellitus ([facility] [address]     Hyperlipidemia     Hypertension     Thyroid disease        Past Surgical History:        Procedure Laterality Date    ABDOMEN SURGERY      TOTAL ABDOMINAL COLECTOMY,ILEO-RECTAL ANASTOMIOSIS, WITH TAKEDOWN OF COLOVESICAL FISTULA AND ABSCESS DRAINAGE WITH COLOPROCTOSTOMY, OMENTECOMY, INTRAOPERATIVE FLEXIBLE SIGMOIDOSCOPY 11/2018 dr. Guanakito Sue    COLONOSCOPY      DILATATION, ESOPHAGUS         Current Medications:   Current Facility-Administered Medications: pantoprazole (PROTONIX) injection 40 mg, 40 mg, IntraVENous, Daily  0.9% NaCl with KCl 40 mEq infusion, , IntraVENous, Continuous  labetalol (NORMODYNE;TRANDATE) injection 10 mg, 10 mg, IntraVENous, Q4H PRN  dilTIAZem (CARDIZEM CD) extended release capsule 240 mg, 240 mg, Oral, Daily  doxazosin (CARDURA) tablet 8 mg, 8 mg, Oral, Daily  levothyroxine (SYNTHROID) tablet 125 mcg, 125 mcg, Oral, Daily  valsartan (DIOVAN) tablet 320 mg, 320 mg, Oral, Daily  glucose chewable tablet 16 g, 4 tablet, Oral, PRN  dextrose bolus 10% 125 mL, 125 mL, IntraVENous, PRN **OR** dextrose bolus 10% 250 mL, 250 mL, IntraVENous, PRN  glucagon (rDNA) injection 1 mg, 1 mg, SubCUTAneous, PRN  dextrose 10 % infusion, , IntraVENous, Continuous PRN  sodium chloride flush 0.9 % injection 5-40 mL, 5-40 mL, IntraVENous, 2 times per day  sodium chloride flush 0.9 % injection 5-40 mL, 5-40 mL, IntraVENous, PRN  0.9 % sodium chloride infusion, , IntraVENous, PRN  polyethylene glycol (GLYCOLAX) packet 17 g, 17 g, Oral, Daily PRN  acetaminophen (TYLENOL) tablet 650 mg, 650 mg, Oral, Q6H PRN **OR** acetaminophen (TYLENOL) suppository 650 mg, 650 mg, Rectal, Q6H PRN  prochlorperazine (COMPAZINE) injection 10 mg, 10 mg, IntraVENous, Q6H PRN  insulin lispro (HUMALOG) injection vial 0-4 Units, 0-4 Units, SubCUTAneous, Q4H  HYDROmorphone (DILAUDID) injection 0.5 mg, 0.5 mg, IntraVENous, Q4H PRN  Prior to Admission medications    Medication Sig Start Date End Date Taking?  Authorizing Provider   dilTIAZebiju RUSSO [facility]) 180 MG extended release capsule Take 180 mg by mouth daily 7/19/18  Yes Historical Provider, MD   blood glucose test strips (ACCU-CHEK MARLON PLUS) strip Use to check blood sugar once daily  DX: E11.9 1/20/23  Yes Historical Provider, MD   hydroCHLOROthiazide (HYDRODIURIL) 25 MG tablet TAKE 1 TABLET BY MOUTH EVERY DAY IN THE MORNING 9/6/22  Yes Historical Provider, MD   potassium chloride (KLOR-CON M20) 20 MEQ extended release tablet TAKE 1 TABLET BY MOUTH TWICE A DAY 10/6/10  Yes Historical Provider, MD   sildenafil (VIAGRA) 100 MG tablet TAKE 1/2 TABLET BY MOUTH DAILY AS NEEDED 12/5/22  Yes Historical Provider, MD   valsartan (DIOVAN) 320 MG tablet TAKE 1 TABLET BY MOUTH EVERY DAY 9/6/22  Yes Historical Provider, MD   aspirin 81 MG EC tablet Take 81 mg by mouth daily    Historical Provider, MD   doxazosin (CARDURA) 8 MG tablet  1/20/23 Historical Provider, MD   fluticasone (FLONASE) 50 MCG/ACT nasal spray 1 spray by Nasal route daily as needed    Historical Provider, MD   levothyroxine (SYNTHROID) 125 MCG tablet TAKE 1 TABLET BY MOUTH EVERY DAY 1/5/23   Historical Provider, MD        Allergies:  Latex and Sulfa antibiotics    Social History:   TOBACCO:   reports that he has quit smoking. His smoking use included cigarettes. He has never used smokeless tobacco.  ETOH:   reports that he does not currently use alcohol. DRUGS:   has no history on file for drug use. Family History:    History reviewed. No pertinent family history. REVIEW OF SYSTEMS:  CONSTITUTIONAL:  negative  HEENT:  negative  RESPIRATORY:  negative  CARDIOVASCULAR:  negative  GASTROINTESTINAL:  negative except for nausea, vomiting, abdominal pain, and abdominal distention  GENITOURINARY:  negative  HEMATOLOGIC/LYMPHATIC:  negative  NEUROLOGICAL:  Negative  * All other ROS reviewed and negative. PHYSICAL EXAM:  VITALS:  BP (!) 175/77   Pulse 61   Temp 98.3 °F (36.8 °C) (Oral)   Resp 16   Ht 6' 1\" (1.854 m)   Wt 223 lb 8 oz (101.4 kg)   SpO2 94%   BMI 29.49 kg/m²   24HR INTAKE/OUTPUT:    I/O last 3 completed shifts: In: 10 [I.V.:10]  Out: -   No intake/output data recorded.       CONSTITUTIONAL:  alert, no apparent distress and normal weight  EYES:  PERRL, sclera clear  ENT:  Normocephalic,atraumatic, without obvious abnormality  NECK:  supple, symmetrical, trachea midline  LUNGS: Resp effort easy and unlabored, no crackles or wheezing  CARDIOVASCULAR:  NO JVD, regular rate and rhythm   ABDOMEN:  scars noted consistent with prior surgery, normal bowel sounds, soft, non-distended, non-tender, voluntary guarding absent  MUSCULOSKELETAL: No clubbing or cyanosis, 0+ pitting edema lower extremities  NEUROLOGIC:  Mental Status Exam:  Level of Alertness:   awake  PSYCHIATRIC:   person, place, time  SKIN:  normal skin color, texture, turgor    DATA:    CBC:   Recent Labs     02/01/23 1728 02/02/23  0545   WBC 9.4 8.4   HGB 13.7 13.0*   HCT 42.2 39.0*    210     BMP:    Recent Labs     02/01/23 1728 02/02/23  0545    141   K 3.4* 4.0   CL 97* 106   CO2 24 26   BUN 25* 26*   CREATININE 1.2 1.3   GLUCOSE 166* 143*     Hepatic:   Recent Labs     02/01/23  1728 02/02/23  0545   AST 19 16   ALT 17 16   BILITOT 0.8 0.6   ALKPHOS 56 50     Mag:      Recent Labs     02/01/23  1728   MG 2.00      Phos:   No results for input(s): PHOS in the last 72 hours. INR:   Recent Labs     02/02/23 0545   INR 1.11       Radiology Review: Images personally reviewed by me. EXAMINATION:   CT OF THE ABDOMEN AND PELVIS WITH CONTRAST 2/1/2023 3:43 pm     TECHNIQUE:   CT of the abdomen and pelvis was performed with the administration of   intravenous contrast. Multiplanar reformatted images are provided for review. Automated exposure control, iterative reconstruction, and/or weight based   adjustment of the mA/kV was utilized to reduce the radiation dose to as low   as reasonably achievable. COMPARISON:   None. HISTORY:   ORDERING SYSTEM PROVIDED HISTORY: concern for bowel obstruction   TECHNOLOGIST PROVIDED HISTORY:   Additional Contrast?->None   Reason for exam:->concern for bowel obstruction   Decision Support Exception - unselect if not a suspected or confirmed   emergency medical condition->Emergency Medical Condition (MA)   Reason for Exam: abd pain   Additional signs and symptoms: full feeling,no BM,vomiting   Relevant Medical/Surgical History: colectomy,due to diverticulitis,squamous   cell bucal cancer     FINDINGS:   Lower Chest: Mild dependent atelectasis is seen bilaterally. Base of the   heart is unremarkable. Organs: No acute or suspicious hepatic abnormalities are identified. Portal   vein is patent. Gallbladder unremarkable. Spleen unremarkable. Adrenals   unremarkable. Mild bilateral perinephric fat stranding is noted.   Bilateral renal cysts are identified, largest on the right measuring 4.1 cm. These are a benign   finding and no follow-up is recommended. GI/Bowel: Distal esophagus and stomach unremarkable. There are dilated loops of small bowel identified within the anterior   abdomen, with a sharp zone of transition seen along the abdominal wall. Small bowel proximally that is mild-to-moderately distended with fluid. Multiple small bowel loops are adherent to ventral abdominal wall, suggesting   adhesions. More distally, the small bowel is decompressed. Patient appears to be status post subtotal colectomy. Ileocolic anastomotic   staple line is seen in the region of the mid sigmoid colon. Pelvis: Urinary bladder and prostate are unremarkable in appearance. No free   pelvic fluid is found. Peritoneum/Retroperitoneum: Abdominal aorta is normal in caliber. Superior   mesenteric artery is enhancing. Bones/Soft Tissues: No acute or suspicious bony abnormalities are identified. Scarring within the ventral abdominal and pelvic wall is noted. Impression:     Findings compatible with small-bowel obstruction, with a sharp zone of   transition along the ventral abdominal wall, almost certainly secondary to an   adhesion. EXAMINATION:   TWO XRAY VIEWS OF THE ABDOMEN AND SINGLE  XRAY VIEW OF THE CHEST     2/2/2023 10:28 am     COMPARISON:   2/1/2023     HISTORY:   ORDERING SYSTEM PROVIDED HISTORY: sbo   TECHNOLOGIST PROVIDED HISTORY:   Reason for exam:->sbo   Reason for Exam: Abdominal Pain; Emesis, possible SBO     FINDINGS:   Persistent mildly dilated small bowel measuring up to 5 cm. Colonic and   rectal stool and gas noted. No free air. No renal calculi. Feeding tube terminates in the proximal stomach. Clear lungs. Normal heart size. No pneumothorax. Significant osteopenic changes and degenerative changes noted in the bony   structures. Normal bony structures .     Impression:     Persistent partial small bowel obstruction. No pneumoperitoneum. No acute finding noted in the chest.        IMPRESSION/RECOMMENDATIONS:    SBO - likely secondary to adhesions from prior surgery    AXR from this AM as above - dilated small bowel remains, however gas and stool in colon/rectum. The pt clinically is improved, having bowel movements and passing flatus. Will trial clamping of ngt today, repeat AXR in AM and if further improved anticipate removing ngt and starting diet, however if they are not, may consider SBFT to further investigate. Electronically signed by Tiffanie Tang, [address] [place] [address] Surgery  [id number]    Surgery Staff    I have examined this patient, and read and agree with the note by Tiffanie Tang CNP from today; more than half of the total time was spent by me on the encounter. 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 I have reviewed his CT which suggests focal obstruction in the lower abdomen. Currently patient feels much better, no abdominal pain, nausea or vomiting, and has had bowel movements here since admission. Anticipate continuing NGT tonight, but if he is continuing to improve over all tomorrow we will then likely d/c NGT and resume PO diet. However, if he were to clinically worsen/regress, then we would consider additional imaging options or even proceed with surgical intervention at that time. I discussed these above issues in detail w/ patient and his family. They appear to understand, ask appropriate questions, and agree with the plan.     Venus Barraza MD

## 2023-02-02 NOTE — PROGRESS NOTES
4 Eyes Skin Assessment     The patient is being assess for   Admission    I agree that 2 RN's have performed a thorough Head to Toe Skin Assessment on the patient. ALL assessment sites listed below have been assessed. Areas assessed for pressure by both nurses:   [x]   Head, Face, and Ears   [x]   Shoulders, Back, and Chest, Abdomen  [x]   Arms, Elbows, and Hands   [x]   Coccyx, Sacrum, and Ischium  [x]   Legs, Feet, and Heels        Skin Assessed Under all Medical Devices by both nurses:  None noted               All Mepilex Borders were peeled back and area peeked at by both nurses:  No: none noted  Please list where Mepilex Borders are located:  none             **SHARE this note so that the co-signing nurse is able to place an eSignature**    Co-signer eSignature: Electronically signed by Colton Narvaez RN on 2/2/23 at 5:35 AM EST    Does the Patient have Skin Breakdown related to pressure?   No              Bernardo Prevention initiated:  NA   Wound Care Orders initiated:  NA      [facility] nurse consulted for Pressure Injury (Stage 3,4, Unstageable, DTI, NWPT, Complex wounds)and New or Established Ostomies:  NA      Primary Nurse eSignature: Electronically signed by Erwin Haas RN on 2/1/23 at 10:54 PM EST

## 2023-02-02 NOTE — PLAN OF CARE
Problem: Pain  Goal: Verbalizes/displays adequate comfort level or baseline comfort level  Outcome: Progressing     Problem: Safety - Adult  Goal: Free from fall injury  Outcome: Progressing Spoke with Dr Faith Barcenas in regards to DC plan and need for Home Care orders and F2F. Child to likely DC Monday. Will reassess tomorrow.

## 2023-02-02 NOTE — CONSULTS
Placed call to General Surgery @ 2019  RE: small bowel obstruction per Ainsley Shelley MD called back @ 2022

## 2023-02-02 NOTE — PROGRESS NOTES
Patient admitted to room 341 from ED. Patient oriented to room, call light, bed rails, phone, lights and bathroom. Bed locked, in lowest position, side rails up 2/4, call light within reach. Will continue to monitor.

## 2023-02-02 NOTE — H&P
Hospital Medicine History & Physical      PCP: Myrna Samson MD    Date of Admission: 2/1/2023    Time of Service: 2200    Date of Service: Pt seen/examined on 2/1/2023 and admitted to Inpatient with expected LOS greater than two midnights due to medical therapy. Historian: Self, ED documentation, Epic chart review, Care Everywhere    Chief Concern:    Chief Complaint   Patient presents with    Abdominal Pain     Per squad report pt c/o abdominal spasms that started around midnight. Pt given 4mg of zofran and 50mcg fentanyl in route to the ED. Pt reports he ate cabbage for dinner and at midnight he vomited his entire meal up. Pt reports no BM in 24 hours. Pt states he has been unable to take any of his daily medications. Emesis     History Of Present Illness:      Arian Jacobs is a 71-year-old male significant past medical history of hypertension, hyperlipidemia, type 2 diabetes controlled with diet, and hypothyroidism who presents to [facility] emergency department via EMS with complaint of abdominal pain, nausea, and vomiting that began today. He also notes has not had a bowel movement in approximate 24 hours which she endorses a very regular BM schedule. 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 He does not have a colostomy, states has not had any issues since his surgery. Denies any fever at home. His evaluation here included laboratory studies, EKG, KUB, and CT of the abdomen pelvis with IV contrast.  CT showed findings compatible with small bowel obstruction with short sharp zone of transition along the ventral abdominal wall likely secondary to adhesion. An NG tube was placed by the ED. Pertinent abnormal lab values include potassium 3.4, BUN 25, blood sugar 166, and lipase 65. Hospital team consulted to admit this patient for small bowel obstruction.     Past Medical History:          Diagnosis Date Cancer ([facility] [address]     Diabetes mellitus ([facility] [address]     Hyperlipidemia     Hypertension     Thyroid disease      Past Surgical History:          Procedure Laterality Date    ABDOMEN SURGERY      COLONOSCOPY      DILATATION, ESOPHAGUS       Medications Prior to Admission:      Prior to Admission medications    Medication Sig Start Date End Date Taking? Authorizing Provider   dilTIAZem [facility]) 180 MG extended release capsule Take 180 mg by mouth daily 7/19/18  Yes Historical Provider, MD   blood glucose test strips (ACCU-CHEK MARLON PLUS) strip Use to check blood sugar once daily  DX: E11.9 1/20/23  Yes Historical Provider, MD   hydroCHLOROthiazide (HYDRODIURIL) 25 MG tablet TAKE 1 TABLET BY MOUTH EVERY DAY IN THE MORNING 9/6/22  Yes Historical Provider, MD   potassium chloride (KLOR-CON M20) 20 MEQ extended release tablet TAKE 1 TABLET BY MOUTH TWICE A DAY 10/6/10  Yes Historical Provider, MD   sildenafil (VIAGRA) 100 MG tablet TAKE 1/2 TABLET BY MOUTH DAILY AS NEEDED 12/5/22  Yes Historical Provider, MD   valsartan (DIOVAN) 320 MG tablet TAKE 1 TABLET BY MOUTH EVERY DAY 9/6/22  Yes Historical Provider, MD   aspirin 81 MG EC tablet Take 81 mg by mouth daily    Historical Provider, MD   doxazosin (CARDURA) 8 MG tablet  1/20/23   Historical Provider, MD   fluticasone (FLONASE) 50 MCG/ACT nasal spray 1 spray by Nasal route daily as needed    Historical Provider, MD   levothyroxine (SYNTHROID) 125 MCG tablet TAKE 1 TABLET BY MOUTH EVERY DAY 1/5/23   Historical Provider, MD     Allergies:  Latex and Sulfa antibiotics    Social History:      The patient currently lives at home with family. TOBACCO:   reports that he has quit smoking. His smoking use included cigarettes. He has never used smokeless tobacco.  ETOH:   reports that he does not currently use alcohol.   E-cigarette/Vaping       Questions Responses    E-cigarette/Vaping Use     Start Date     Passive Exposure     Quit Date     Counseling Given     Comments Family History:      Reviewed in detail at bedside with patient; does not recall pertinent family history    History reviewed. No pertinent family history. REVIEW OF SYSTEMS COMPLETED:   Pertinent positives as noted in the HPI. All other systems reviewed and negative. PHYSICAL EXAM PERFORMED:    BP (!) 159/84   Pulse 66   Temp 98 °F (36.7 °C) (Oral)   Resp 16   Ht 6' 1\" (1.854 m)   Wt 223 lb 8 oz (101.4 kg)   SpO2 95%   BMI 29.49 kg/m²     General appearance:  No apparent distress, NG tube to place in the right nare, appears stated age and cooperative. HEENT:  Normal cephalic, atraumatic without obvious deformity. Pupils equal, round, and reactive to light. Extra ocular muscles intact. Conjunctivae/corneas clear. Neck: Supple, with full range of motion. No jugular venous distention. Trachea midline. Respiratory:  Normal respiratory effort. Clear to auscultation, bilaterally without Rales/Wheezes/Rhonchi. Cardiovascular:  Regular rate and rhythm with normal S1/S2 without murmurs, rubs or gallops. Abdomen: Soft, diffusely tender without no point of maximal pain, mildly distended, with normal hypoactive bowel sounds. Musculoskeletal:  No clubbing, cyanosis or edema bilaterally. Full range of motion without deformity. Skin: Skin color, texture, turgor normal.  No rashes or lesions. Neurologic:  Neurovascularly intact without any focal sensory/motor deficits.  Cranial nerves: II-XII intact, grossly non-focal.  Psychiatric:  Alert and oriented, thought content appropriate, normal insight  Capillary Refill: Brisk,3 seconds, normal  Peripheral Pulses: +2 palpable, equal bilaterally     Labs:     Recent Labs     02/01/23  1728   WBC 9.4   HGB 13.7   HCT 42.2        Recent Labs     02/01/23  1728      K 3.4*   CL 97*   CO2 24   BUN 25*   CREATININE 1.2   CALCIUM 9.7     Recent Labs     02/01/23  1728   AST 19   ALT 17   BILITOT 0.8   ALKPHOS 56     Urinalysis:      Lab Results Component Value Date/Time    NITRU Negative 02/01/2023 11:00 PM    [address] None seen 02/01/2023 11:00 PM    RBCUA 0-2 02/01/2023 11:00 PM    BLOODU TRACE-LYSED 02/01/2023 11:00 PM    SPECGRAV 1.015 02/01/2023 11:00 PM    GLUCOSEU Negative 02/01/2023 11:00 PM     Radiology:     I have reviewed the radiographic results for this encounter with the following interpretation(s); see report(s) below:    XR ABDOMEN (KUB) (SINGLE AP VIEW)   Final Result   Enteric tube tip and side hole project at the gastric body. CT ABDOMEN PELVIS W IV CONTRAST Additional Contrast? None   Final Result   Findings compatible with small-bowel obstruction, with a sharp zone of   transition along the ventral abdominal wall, almost certainly secondary to an   adhesion.              EKG:  I have reviewed the EKG with the following interpretation in the absence of a cardiologist: Normal sinus rhythm, 81 bpm, prolonged QTC at 47 ms, no acute ST segment or T wave deviations; nonacute EKG    Consults:    IP CONSULT TO GENERAL SURGERY  IP CONSULT TO GENERAL SURGERY    ASSESSMENT:    Active Hospital Problems    Diagnosis Date Noted    SBO (small bowel obstruction) ([facility] [address] [K56.609] 02/01/2023     Priority: High    Hyperglycemia due to type 2 diabetes mellitus ([facility] [address] [E11.65] 02/02/2023     Priority: Medium    Hypertension [I10] 02/02/2023     Priority: Medium       PLAN:    Small Bowel Obstruction  - CT showing ventral bowel obstruction  - NG placed, KUB confirmed placement, continue LIWS  - NPO x ice chips  - General surgery consulted for eval in AM  - Continue supportive care; MIVF, pain/nausea control    Hypertension  - Elevated BP readings in ED  - Oral meds on hold until NG removed  - PRN labetalol for BP goal < 160/99    Hypokalemia  - Replace will Kcl in IVF  - Repeat labs in AM    Type 2 DM  - Diet-controlled at home, A1c recent, good glycemic control  - Low-dose NPO SSI q4h here  - Low-carb diet when able to eat      DVT Prophylaxis: SCDs  SRMB Prophylaxis: Protonix IVP  Diet: ADULT DIET; Full Liquid; Low Fat/Low Chol/High Fiber/2 gm Na  Code Status: Full Code    PT/OT Eval Status: N/A at this time    Dispo - 2-3 days per clinical improvement    Jose Alejandro Barrios, APRN - CNP    Thank you Josseline Andujar MD for the opportunity to be involved in this patient's care.  If you have any questions or concerns please feel free to contact me at [phone]. ---Anticipated co-signer, Dr. Abhay James    (Please note that portions of this note were completed with a voice recognition program. Efforts were made to edit the dictations but occasionally words are mis-transcribed.)

## 2023-02-02 NOTE — CONSULTS
Consult placed    Who:keily  Date:2/2/2023,  Time:7:00 AM        Electronically signed by Gisella Winter on 2/2/2023 at 7:00 AM

## 2023-02-03 ENCOUNTER — APPOINTMENT (OUTPATIENT)
Dept: GENERAL RADIOLOGY | Age: 78
DRG: 390 | End: 2023-02-03
Payer: COMMERCIAL

## 2023-02-03 LAB
BACTERIA: ABNORMAL /HPF
BILIRUBIN URINE: NEGATIVE
BLOOD, URINE: NEGATIVE
CLARITY: CLEAR
COLOR: YELLOW
GLUCOSE BLD-MCNC: 103 MG/DL (ref 70–99)
GLUCOSE BLD-MCNC: 103 MG/DL (ref 70–99)
GLUCOSE BLD-MCNC: 104 MG/DL (ref 70–99)
GLUCOSE BLD-MCNC: 113 MG/DL (ref 70–99)
GLUCOSE BLD-MCNC: 122 MG/DL (ref 70–99)
GLUCOSE BLD-MCNC: 127 MG/DL (ref 70–99)
GLUCOSE URINE: NEGATIVE MG/DL
KETONES, URINE: ABNORMAL MG/DL
LEUKOCYTE ESTERASE, URINE: ABNORMAL
MICROSCOPIC EXAMINATION: YES
NITRITE, URINE: NEGATIVE
PERFORMED ON: ABNORMAL
PH UA: 6 (ref 5–8)
PROTEIN UA: NEGATIVE MG/DL
RBC UA: ABNORMAL /HPF (ref 0–4)
SPECIFIC GRAVITY UA: 1.02 (ref 1–1.03)
URINE REFLEX TO CULTURE: ABNORMAL
URINE TYPE: ABNORMAL
UROBILINOGEN, URINE: 0.2 E.U./DL
WBC UA: ABNORMAL /HPF (ref 0–5)

## 2023-02-03 PROCEDURE — APPSS30 APP SPLIT SHARED TIME 16-30 MINUTES: Performed by: CLINICAL NURSE SPECIALIST

## 2023-02-03 PROCEDURE — 74019 RADEX ABDOMEN 2 VIEWS: CPT

## 2023-02-03 PROCEDURE — 6370000000 HC RX 637 (ALT 250 FOR IP): Performed by: NURSE PRACTITIONER

## 2023-02-03 PROCEDURE — 83835 ASSAY OF METANEPHRINES: CPT

## 2023-02-03 PROCEDURE — 2580000003 HC RX 258: Performed by: NURSE PRACTITIONER

## 2023-02-03 PROCEDURE — C9113 INJ PANTOPRAZOLE SODIUM, VIA: HCPCS | Performed by: NURSE PRACTITIONER

## 2023-02-03 PROCEDURE — 2500000003 HC RX 250 WO HCPCS: Performed by: NURSE PRACTITIONER

## 2023-02-03 PROCEDURE — 81001 URINALYSIS AUTO W/SCOPE: CPT

## 2023-02-03 PROCEDURE — 1200000000 HC SEMI PRIVATE

## 2023-02-03 PROCEDURE — 6360000002 HC RX W HCPCS: Performed by: NURSE PRACTITIONER

## 2023-02-03 PROCEDURE — 6370000000 HC RX 637 (ALT 250 FOR IP): Performed by: INTERNAL MEDICINE

## 2023-02-03 PROCEDURE — 99232 SBSQ HOSP IP/OBS MODERATE 35: CPT | Performed by: SURGERY

## 2023-02-03 RX ORDER — HYDRALAZINE HYDROCHLORIDE 20 MG/ML
10 INJECTION INTRAMUSCULAR; INTRAVENOUS EVERY 4 HOURS PRN
Status: DISCONTINUED | OUTPATIENT
Start: 2023-02-03 | End: 2023-02-05 | Stop reason: HOSPADM

## 2023-02-03 RX ORDER — NIFEDIPINE 30 MG/1
30 TABLET, EXTENDED RELEASE ORAL 2 TIMES DAILY
Status: DISCONTINUED | OUTPATIENT
Start: 2023-02-03 | End: 2023-02-05 | Stop reason: HOSPADM

## 2023-02-03 RX ORDER — DILTIAZEM HYDROCHLORIDE 240 MG/1
240 CAPSULE, EXTENDED RELEASE ORAL DAILY
COMMUNITY

## 2023-02-03 RX ADMIN — SODIUM CHLORIDE, PRESERVATIVE FREE 10 ML: 5 INJECTION INTRAVENOUS at 20:54

## 2023-02-03 RX ADMIN — DILTIAZEM HYDROCHLORIDE 240 MG: 240 CAPSULE, COATED, EXTENDED RELEASE ORAL at 08:30

## 2023-02-03 RX ADMIN — LABETALOL HYDROCHLORIDE 10 MG: 5 INJECTION INTRAVENOUS at 06:16

## 2023-02-03 RX ADMIN — DOXAZOSIN MESYLATE 8 MG: 2 TABLET ORAL at 08:30

## 2023-02-03 RX ADMIN — HYDRALAZINE HYDROCHLORIDE 10 MG: 20 INJECTION INTRAMUSCULAR; INTRAVENOUS at 01:27

## 2023-02-03 RX ADMIN — NIFEDIPINE 30 MG: 30 TABLET, FILM COATED, EXTENDED RELEASE ORAL at 20:23

## 2023-02-03 RX ADMIN — HYDRALAZINE HYDROCHLORIDE 10 MG: 20 INJECTION INTRAMUSCULAR; INTRAVENOUS at 20:52

## 2023-02-03 RX ADMIN — LABETALOL HYDROCHLORIDE 10 MG: 5 INJECTION INTRAVENOUS at 00:17

## 2023-02-03 RX ADMIN — Medication 10 ML: at 00:19

## 2023-02-03 RX ADMIN — SODIUM CHLORIDE, PRESERVATIVE FREE 10 ML: 5 INJECTION INTRAVENOUS at 08:30

## 2023-02-03 RX ADMIN — ACETAMINOPHEN 650 MG: 325 TABLET, FILM COATED ORAL at 08:30

## 2023-02-03 RX ADMIN — Medication 10 ML: at 01:28

## 2023-02-03 RX ADMIN — PANTOPRAZOLE SODIUM 40 MG: 40 INJECTION, POWDER, FOR SOLUTION INTRAVENOUS at 08:30

## 2023-02-03 RX ADMIN — LABETALOL HYDROCHLORIDE 10 MG: 5 INJECTION INTRAVENOUS at 11:00

## 2023-02-03 RX ADMIN — VALSARTAN 320 MG: 80 TABLET, FILM COATED ORAL at 08:30

## 2023-02-03 ASSESSMENT — PAIN DESCRIPTION - DESCRIPTORS: DESCRIPTORS: ACHING

## 2023-02-03 ASSESSMENT — PAIN SCALES - GENERAL
PAINLEVEL_OUTOF10: 0
PAINLEVEL_OUTOF10: 4
PAINLEVEL_OUTOF10: 4
PAINLEVEL_OUTOF10: 0

## 2023-02-03 ASSESSMENT — PAIN DESCRIPTION - LOCATION: LOCATION: HEAD

## 2023-02-03 NOTE — PROGRESS NOTES
Hospitalist Progress Note      PCP: Jonathan Crowe MD    Date of Admission: 2/1/2023    Chief Complaint: Abdominal pain    Hospital Course:      Isabelle Ramos is a 67-year-old male significant past medical history of hypertension, hyperlipidemia, type 2 diabetes controlled with diet, and hypothyroidism who presents to [facility] emergency department via EMS with complaint of abdominal pain, nausea, and vomiting that began today. He also notes has not had a bowel movement in approximate 24 hours which she endorses a very regular BM schedule. 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 He does not have a colostomy, states has not had any issues since his surgery. Denies any fever at home. His evaluation here included laboratory studies, EKG, KUB, and CT of the abdomen pelvis with IV contrast.  CT showed findings compatible with small bowel obstruction with short sharp zone of transition along the ventral abdominal wall likely secondary to adhesion. An NG tube was placed by the ED. Pertinent abnormal lab values include potassium 3.4, BUN 25, blood sugar 166, and lipase 65. Hospital team consulted to admit this patient for small bowel obstruction. Patient denies prior episode of small bowel obstruction. Subjective:   Patient denies any nausea or vomiting with NG tube clamping. Patient states surgery will pull NG and start him on clear liquids. Patient is passing gas. No fevers or chills. Abdominal discomfort improved.     Medications:  Reviewed    Infusion Medications    dextrose      sodium chloride       Scheduled Medications    pantoprazole  40 mg IntraVENous Daily    dilTIAZem  240 mg Oral Daily    doxazosin  8 mg Oral Daily    levothyroxine  125 mcg Oral Daily    valsartan  320 mg Oral Daily    sodium chloride flush  5-40 mL IntraVENous 2 times per day    insulin lispro  0-4 Units SubCUTAneous Q4H     PRN Meds: hydrALAZINE, labetalol, glucose, dextrose bolus **OR** dextrose bolus, glucagon (rDNA), dextrose, sodium chloride flush, sodium chloride, polyethylene glycol, acetaminophen **OR** acetaminophen, prochlorperazine, HYDROmorphone      Intake/Output Summary (Last 24 hours) at 2/3/2023 1258  Last data filed at 2/3/2023 1130  Gross per 24 hour   Intake 30 ml   Output 500 ml   Net -470 ml         Physical Exam Performed:    BP (!) 186/84   Pulse 63   Temp 98.3 °F (36.8 °C) (Oral)   Resp 16   Ht 6' 1\" (1.854 m)   Wt 223 lb 8 oz (101.4 kg)   SpO2 95%   BMI 29.49 kg/m²     General appearance: No apparent distress, appears stated age and cooperative. HEENT: Pupils equal, round, and reactive to light. Conjunctivae/corneas clear. NG tube present  Neck: Supple, with full range of motion. No jugular venous distention. Trachea midline. Respiratory:  Normal respiratory effort. Clear to auscultation, bilaterally without Rales/Wheezes/Rhonchi. Cardiovascular: Regular rate and rhythm with normal S1/S2 without murmurs, rubs or gallops. Abdomen: Soft, non-tender, non-distended with hypoactive bowel sounds. Musculoskeletal: No clubbing, cyanosis or edema bilaterally. Full range of motion without deformity. Skin: Skin color, texture, turgor normal.  No rashes or lesions. Neurologic:  Neurovascularly intact without any focal sensory/motor deficits.  Cranial nerves: II-XII intact, grossly non-focal.  Psychiatric: Alert and oriented, thought content appropriate, normal insight  Capillary Refill: Brisk, 3 seconds, normal   Peripheral Pulses: +2 palpable, equal bilaterally       Labs:   Recent Labs     02/01/23  1728 02/02/23  0545   WBC 9.4 8.4   HGB 13.7 13.0*   HCT 42.2 39.0*    210       Recent Labs     02/01/23  1728 02/02/23  0545    141   K 3.4* 4.0   CL 97* 106   CO2 24 26   BUN 25* 26*   CREATININE 1.2 1.3   CALCIUM 9.7 9.5       Recent Labs     02/01/23  1728 02/02/23  0545   AST 19 16   ALT 17 16   BILITOT 0.8 0.6   ALKPHOS 56 50       Recent Labs     02/02/23  0545   INR 1.11       No results for input(s): CKTOTAL, TROPONINI in the last 72 hours. Urinalysis:      Lab Results   Component Value Date/Time    NITRU Negative 02/01/2023 11:00 PM    WBCUA None seen 02/01/2023 11:00 PM    RBCUA 0-2 02/01/2023 11:00 PM    BLOODU TRACE-LYSED 02/01/2023 11:00 PM    SPECGRAV 1.015 02/01/2023 11:00 PM    GLUCOSEU Negative 02/01/2023 11:00 PM       Radiology:  XR ABDOMEN (2 VIEWS)   Final Result   Improving but incompletely resolved small bowel distension. XR ACUTE ABD SERIES CHEST 1 VW   Final Result   Persistent partial small bowel obstruction. No pneumoperitoneum. No acute finding noted in the chest.         XR ABDOMEN (KUB) (SINGLE AP VIEW)   Final Result   Enteric tube tip and side hole project at the gastric body. CT ABDOMEN PELVIS W IV CONTRAST Additional Contrast? None   Final Result   Findings compatible with small-bowel obstruction, with a sharp zone of   transition along the ventral abdominal wall, almost certainly secondary to an   adhesion. IP CONSULT TO GENERAL SURGERY  IP CONSULT TO GENERAL SURGERY  IP CONSULT TO NEPHROLOGY    Assessment/Plan:    Active Hospital Problems    Diagnosis     Hyperglycemia due to type 2 diabetes mellitus ([facility] [address] [E11.65]      Priority: Medium    Hypertension [I10]      Priority: Medium    SBO (small bowel obstruction) ([facility] [address] [K56.609]      Priority: Medium     1. Abdominal pain secondary to small bowel obstruction. Patient with transition point. Surgery following. Plan for NG tube removal and clear liquids. Will advance diet as tolerated per surgery. 2.  Hypertension. We will continue to monitor his blood pressure. Continue valsartan, Cardura and diltiazem. Nephrology consulted to help assist with blood pressure control. 3.  FEN. Monitor electrolytes and correct as needed. Discontinue IV fluids  4. Type 2 diabetes.   We will continue insulin sliding scale. Monitor closely. DVT Prophylaxis: Patient will have SCDs ordered. Diet: Diet NPO Exceptions are: Ice Chips  ADULT DIET;  Clear Liquid  Code Status: Full Code  PT/OT Eval Status: Up with assistance PT OT pending    Dispo -home when stable    Appropriate for A1 Discharge Unit: Lilli Sloan MD

## 2023-02-03 NOTE — CONSULTS
Thanks for consulting [facility]ph[facility] for the care of Alfonsona Batters. Full consult will follow  Please call with questions at-  24 Hrs Answering service [phone]  Perfect serve, or cell phone 7 am - [address] [facility], MD   [facility] nephrology  [facility]. [facility]

## 2023-02-03 NOTE — CARE COORDINATION
CASE MANAGEMENT INITIAL ASSESSMENT      Reviewed chart and completed assessment with patient:bedside  Family present: wife  Explained Case Management role/services. Primary contact information:Susan    [facility] [place] [facility] Decision Maker :   Primary Decision Maker: Catia Go - Spouse - [phone]          Can this person be reached and be able to respond quickly, such as within a few minutes or hours? Yes      Admit date/status:2/1/23  Diagnosis:SBO   Is this a Readmission?:  No    Readmission Screening completed?: No     Insurance:medicare   Precert required for SNF: No       3 night stay required: No    Living arrangements, Adls, care needs, prior to admission:lives in 2 story home with wife. [address] at home:  Walker__Cane__RTS__ BSC__Shower Chair__  02__ HHN__ CPAP__  BiPap__  Hospital Bed__ W/C___ Other_____    Services in the home and/or outpatient, prior to admission:none    Current PCP:Elvis Delgado                                Medications Prescription coverage? yes    Transportation needs: none       PT/OT recs:none    Hospital Exemption Notification (HEN):needed for SNF    Barriers to discharge:none    Plan/comments:spoke with patient and wife. Stephanie Tejeda will be able to get to any follow up appts. Denied any DCP needs.  Currently with NGT, will need return of GI fx. Danielle Dodge RN      ECOC on chart for MD signature

## 2023-02-03 NOTE — CONSULTS
Interval History and plan:      Creatinine 1.3 on presentation-coming down to 1.2  EGFR of 57 ml/ml  Blood pressure very high now able to take p.o. though he still has NG tube    Plan:    His blood pressure uncontrolled as outpatient  Will try to simplify his regimen and change  Discontinue Cardura, discontinue Cardizem  Is started on nifedipine  We will plan to put him back on hydrochlorothiazide once his current GI situation is taking care of  May need Aldactone if his blood pressure is resistant  Will be cautious though since he has CKD IIIa  Check for aldosterone/renin ratio and metanephrines,                     Assessment :       Hypertension   BP: (181-197)/(84-87)  Heart Rate:  [63-70]   BP goal inpatient 859-800 systolic inpatient  Uncontrolled  Multiple medication changed as outpatient  Because of GI upset/possible obstruction vomiting and not absorbing current medications  Blood pressure as high as 674 systolic and 87 diastolic this hospitalization  On diltiazem, valsartan, doxazosin, also on hydrocodone chlorothiazide at home  History of the smoking    CKD Stage IIIa  Creatinine 1.2 on consult  Creatinine 1.34 on 1/30/2020  Creatinine 1.5 on 10/22  Creatinine 1.34 on 6/22  Currently kidney disease likely due to  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 would like to thank Hallie Carrera MD   for opportunity to serve this patient      Please call with questions at-   24 Hrs Answering service [phone] or  7 am- 5 pm via Perfect serve or cell phone  Wiliam Walton MD          CC/reason for consult :     Given hypertension     HPI :     Erwin Hyatt is a 69 y.o. male presented to   the hospital on 2/1/2023 with pain abdomen, nausea vomiting for a day. He has NG tube inserted. He is known to have hypertension for many years. He also has a strong family history of hypertension. Denies taking any Aleve Motrin naproxen at home. 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 He has colostomy. His blood pressure has been uncontrolled for last several days to weeks now. His primary care physician was planning to refer to me for hypertension management. Was unable to take blood pressure medicine yesterday and was given IV      ROS:     Seen with-spouse    positives in bold   Constitutional:  fever, chills, weakness, weight change, fatigue  Skin:  rash, pruritus, hair loss, bruising, dry skin, petechiae  Head, Face, Neck   headaches, swelling,  cervical adenopathy  Respiratory: shortness of breath, cough, or wheezing  Cardiovascular: chest pain, palpitations, dizzy, edema  Gastrointestinal: nausea, vomiting, diarrhea, constipation,belly pain    Yellow skin, blood in stool  Musculoskeletal:  back pain, muscle weakness, gait problems,       joint pain or swelling. Genitourinary:  dysuria, poor urine flow, flank pain, blood in urine  Neurologic:  vertigo, TIA'S, syncope, seizures, focal weakness  Psychosocial:  insomnia, anxiety, or depression.   Additional positive findings:                    All other remaining systems are negative or unable to obtain        PMH/PSH/SH/Family History:     Past Medical History:   Diagnosis Date    Cancer ([facility] [address]     Diabetes mellitus ([facility] [address]     Hyperlipidemia     Hypertension     Thyroid disease        Past Surgical History:   Procedure Laterality Date    ABDOMEN SURGERY      TOTAL ABDOMINAL COLECTOMY,ILEO-RECTAL ANASTOMIOSIS, WITH TAKEDOWN OF COLOVESICAL FISTULA AND ABSCESS DRAINAGE WITH COLOPROCTOSTOMY, OMENTECOMY, INTRAOPERATIVE FLEXIBLE SIGMOIDOSCOPY 11/2018 dr. Edu Baptiste, ESOPHAGUS          reports that he has quit smoking. His smoking use included cigarettes. He has never used smokeless tobacco. He reports that he does not currently use alcohol.    family history is not on file. Medication:     Current Facility-Administered Medications: hydrALAZINE (APRESOLINE) injection 10 mg, 10 mg, IntraVENous, Q4H PRN  pantoprazole (PROTONIX) injection 40 mg, 40 mg, IntraVENous, Daily  labetalol (NORMODYNE;TRANDATE) injection 10 mg, 10 mg, IntraVENous, Q4H PRN  levothyroxine (SYNTHROID) tablet 125 mcg, 125 mcg, Oral, Daily  valsartan (DIOVAN) tablet 320 mg, 320 mg, Oral, Daily  glucose chewable tablet 16 g, 4 tablet, Oral, PRN  dextrose bolus 10% 125 mL, 125 mL, IntraVENous, PRN **OR** dextrose bolus 10% 250 mL, 250 mL, IntraVENous, PRN  glucagon (rDNA) injection 1 mg, 1 mg, SubCUTAneous, PRN  dextrose 10 % infusion, , IntraVENous, Continuous PRN  sodium chloride flush 0.9 % injection 5-40 mL, 5-40 mL, IntraVENous, 2 times per day  sodium chloride flush 0.9 % injection 5-40 mL, 5-40 mL, IntraVENous, PRN  0.9 % sodium chloride infusion, , IntraVENous, PRN  polyethylene glycol (GLYCOLAX) packet 17 g, 17 g, Oral, Daily PRN  acetaminophen (TYLENOL) tablet 650 mg, 650 mg, Oral, Q6H PRN **OR** acetaminophen (TYLENOL) suppository 650 mg, 650 mg, Rectal, Q6H PRN  prochlorperazine (COMPAZINE) injection 10 mg, 10 mg, IntraVENous, Q6H PRN  insulin lispro (HUMALOG) injection vial 0-4 Units, 0-4 Units, SubCUTAneous, Q4H  HYDROmorphone (DILAUDID) injection 0.5 mg, 0.5 mg, IntraVENous, Q4H PRN       Vitals :     Vitals:    02/03/23 1130   BP: (!) 186/84   Pulse:    Resp:    Temp:    SpO2:        I & O :       Intake/Output Summary (Last 24 hours) at 2/3/2023 1509  Last data filed at 2/3/2023 1130  Gross per 24 hour   Intake 30 ml   Output 500 ml   Net -470 ml        Physical Examination :     General appearance: Alert, oriented, pleasant, obese  JVD-not raised  Respiratory: Normal  Cardiovascular:  Ausculation- No M/R/G, Edema none  Abdomen: - soft  Musculoskeletal:  NA  Skin: rashes- none  Other findings: na  NG tube at initial consult  Has colostomy     LABS:     Recent Labs     02/01/23  1728 02/02/23  0545   WBC 9.4 8.4   HGB 13.7 13.0*   HCT 42.2 39.0*    210     Recent Labs     02/01/23  1728 02/02/23  0545    141   K 3.4* 4.0   CL 97* 106   CO2 24 26   BUN 25* 26*   CREATININE 1.2 1.3   GLUCOSE 166* 143*   MG 2.00  --             Thanks,   [facility]  [address] [facility] [address], [address] [facility]  Office: [phone]  Fax: [phone]

## 2023-02-03 NOTE — PROGRESS NOTES
[facility]    PATIENT NAME: yRlie Duong     TODAY'S DATE: 2/3/2023    CHIEF COMPLAINT: none    INTERVAL HISTORY/HPI:    Pt reports he is passing flatus, denies abd pain or nausea and carol ngt clamping. REVIEW OF SYSTEMS:  Pertinent positives and negatives as per interval history section    OBJECTIVE:  VITALS:  BP (!) 186/84   Pulse 63   Temp 98.3 °F (36.8 °C) (Oral)   Resp 16   Ht 6' 1\" (1.854 m)   Wt 223 lb 8 oz (101.4 kg)   SpO2 95%   BMI 29.49 kg/m²     INTAKE/OUTPUT:    I/O last 3 completed shifts: In: 36 [P.O.:30; I.V.:10]  Out: 800 [Emesis/NG output:800]  No intake/output data recorded. CONSTITUTIONAL:  awake and alert  LUNGS:  Respirations easy and unlabored, no crackles or wheezing  CARD:  regular rate and rhythm  ABDOMEN:   soft, non-distended, non-tender     Data:  CBC:   Recent Labs     02/01/23  1728 02/02/23  0545   WBC 9.4 8.4   HGB 13.7 13.0*   HCT 42.2 39.0*    210     BMP:    Recent Labs     02/01/23  1728 02/02/23  0545    141   K 3.4* 4.0   CL 97* 106   CO2 24 26   BUN 25* 26*   CREATININE 1.2 1.3   GLUCOSE 166* 143*     Hepatic:   Recent Labs     02/01/23  1728 02/02/23  0545   AST 19 16   ALT 17 16   BILITOT 0.8 0.6   ALKPHOS 56 50     Mag:      Recent Labs     02/01/23  1728   MG 2.00      Phos:   No results for input(s): PHOS in the last 72 hours. INR:   Recent Labs     02/02/23  0545   INR 1.11       Radiology Review:  *Imaging personally reviewed by me. EXAMINATION:   TWO XRAY VIEWS OF THE ABDOMEN     2/3/2023 5:15 am     COMPARISON:   None. HISTORY:   ORDERING SYSTEM PROVIDED HISTORY: SBO   TECHNOLOGIST PROVIDED HISTORY:   Reason for exam:->SBO   Reason for Exam: SBO     FINDINGS:   A nasogastric tube remains coiled in the gastric body. Several mildly   dilated gas-filled small bowel loops persist within the central abdomen. The   largest bowel loop now measures 2.5 cm in diameter. There is no free air.    There is no organomegaly or suspicious calcification. The osseous structures   are unremarkable. Impression:     Improving but incompletely resolved small bowel distension. ASSESSMENT AND PLAN:  SBO: clinically improving, AXR as above. Remove ngt today and start clear liquids. [id number] [address] Santos for pt's wife to massage his abdomen     Electronically signed by Jabier Banuelos, APRN - [facility]    Surgery Staff    I have examined this patient, and read and agree with the note by Jabier Banuelos CNP from today; I also reviewed today's imaging study; more than half of the total time was spent by me on the encounter. Rapidly resolving SBO with return of bowel function, benign exam, and improving AXR. Will d/c NGT and begin PO diet. Anticipate d/c home in 1-2 days if tolerating PO diet.     Sammy Cevallos MD

## 2023-02-03 NOTE — CONSULTS
Consult placed    Hypertension, CKD    Who:Alejandra  Date:2/3/2023,  Time:10:58 AM        Electronically signed by Eliane Moreau on 2/3/2023 at 10:58 AM

## 2023-02-03 NOTE — PLAN OF CARE
Problem: Pain  Goal: Verbalizes/displays adequate comfort level or baseline comfort level  2/3/2023 0321 by Zen Cohen RN  Outcome: Progressing     Problem: Safety - Adult  Goal: Free from fall injury  2/3/2023 0321 by Zen Cohen RN  Outcome: Progressing

## 2023-02-03 NOTE — PROGRESS NOTES
Rodolfo Madden NP    Patient admitted last night for SBO. BP elevated 208/95 gave PRN Labetalol 10 mg at 0017. BP's are actually climbing. Last BP was 214/97. It has been about 30 min since dose of Labetalol. Can we have something else?

## 2023-02-04 LAB
CORTISOL TOTAL: 19.6 UG/DL
GLUCOSE BLD-MCNC: 104 MG/DL (ref 70–99)
GLUCOSE BLD-MCNC: 121 MG/DL (ref 70–99)
GLUCOSE BLD-MCNC: 143 MG/DL (ref 70–99)
GLUCOSE BLD-MCNC: 89 MG/DL (ref 70–99)
PERFORMED ON: ABNORMAL
PERFORMED ON: NORMAL

## 2023-02-04 PROCEDURE — 6370000000 HC RX 637 (ALT 250 FOR IP): Performed by: NURSE PRACTITIONER

## 2023-02-04 PROCEDURE — 82088 ASSAY OF ALDOSTERONE: CPT

## 2023-02-04 PROCEDURE — 6370000000 HC RX 637 (ALT 250 FOR IP): Performed by: INTERNAL MEDICINE

## 2023-02-04 PROCEDURE — 82533 TOTAL CORTISOL: CPT

## 2023-02-04 PROCEDURE — 6360000002 HC RX W HCPCS: Performed by: NURSE PRACTITIONER

## 2023-02-04 PROCEDURE — C9113 INJ PANTOPRAZOLE SODIUM, VIA: HCPCS | Performed by: NURSE PRACTITIONER

## 2023-02-04 PROCEDURE — 84244 ASSAY OF RENIN: CPT

## 2023-02-04 PROCEDURE — 99231 SBSQ HOSP IP/OBS SF/LOW 25: CPT | Performed by: SURGERY

## 2023-02-04 PROCEDURE — 36415 COLL VENOUS BLD VENIPUNCTURE: CPT

## 2023-02-04 PROCEDURE — 1200000000 HC SEMI PRIVATE

## 2023-02-04 PROCEDURE — 2580000003 HC RX 258: Performed by: NURSE PRACTITIONER

## 2023-02-04 RX ORDER — INSULIN LISPRO 100 [IU]/ML
0-4 INJECTION, SOLUTION INTRAVENOUS; SUBCUTANEOUS
Status: DISCONTINUED | OUTPATIENT
Start: 2023-02-04 | End: 2023-02-05 | Stop reason: HOSPADM

## 2023-02-04 RX ORDER — INSULIN LISPRO 100 [IU]/ML
0-4 INJECTION, SOLUTION INTRAVENOUS; SUBCUTANEOUS NIGHTLY
Status: DISCONTINUED | OUTPATIENT
Start: 2023-02-04 | End: 2023-02-05 | Stop reason: HOSPADM

## 2023-02-04 RX ADMIN — SODIUM CHLORIDE, PRESERVATIVE FREE 10 ML: 5 INJECTION INTRAVENOUS at 10:04

## 2023-02-04 RX ADMIN — PANTOPRAZOLE SODIUM 40 MG: 40 INJECTION, POWDER, FOR SOLUTION INTRAVENOUS at 10:04

## 2023-02-04 RX ADMIN — LEVOTHYROXINE SODIUM 125 MCG: 0.12 TABLET ORAL at 07:07

## 2023-02-04 RX ADMIN — VALSARTAN 320 MG: 80 TABLET, FILM COATED ORAL at 10:05

## 2023-02-04 RX ADMIN — NIFEDIPINE 30 MG: 30 TABLET, FILM COATED, EXTENDED RELEASE ORAL at 10:05

## 2023-02-04 RX ADMIN — NIFEDIPINE 30 MG: 30 TABLET, FILM COATED, EXTENDED RELEASE ORAL at 20:41

## 2023-02-04 RX ADMIN — SODIUM CHLORIDE, PRESERVATIVE FREE 10 ML: 5 INJECTION INTRAVENOUS at 20:41

## 2023-02-04 ASSESSMENT — PAIN SCALES - GENERAL
PAINLEVEL_OUTOF10: 0
PAINLEVEL_OUTOF10: 0

## 2023-02-04 NOTE — PROGRESS NOTES
Hospitalist Progress Note      PCP: Casimiro Dowell MD    Date of Admission: 2/1/2023    Chief Complaint: Abdominal pain    Hospital Course:      Tito Mayo is a 52-year-old male significant past medical history of hypertension, hyperlipidemia, type 2 diabetes controlled with diet, and hypothyroidism who [place] [facility] [place] with complaint of abdominal pain, nausea, and vomiting that began today. He also notes has not had a bowel movement in approximate 24 hours which she endorses a very regular BM schedule. 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 He does not have a colostomy, states has not had any issues since his surgery. Denies any fever at home. His evaluation here included laboratory studies, EKG, KUB, and CT of the abdomen pelvis with IV contrast.  CT showed findings compatible with small bowel obstruction with short sharp zone of transition along the ventral abdominal wall likely secondary to adhesion. An NG tube was placed by the ED. Pertinent abnormal lab values include potassium 3.4, BUN 25, blood sugar 166, and lipase 65. Hospital team consulted to admit this patient for small bowel obstruction. Patient denies prior episode of small bowel obstruction. Subjective:   Patient improving. No nausea or vomiting after NG tube removed. Patient denies any abdominal pain. No fevers or chills. No shortness of breath. Patient tolerated clear liquid diet. Positive bowel sounds and flatus noted.     Medications:  Reviewed    Infusion Medications    dextrose      sodium chloride       Scheduled Medications    insulin lispro  0-4 Units SubCUTAneous TID WC    insulin lispro  0-4 Units SubCUTAneous Nightly    NIFEdipine  30 mg Oral BID    pantoprazole  40 mg IntraVENous Daily    levothyroxine  125 mcg Oral Daily    valsartan  320 mg Oral Daily    sodium chloride flush  5-40 mL IntraVENous 2 times per day     PRN Meds: hydrALAZINE, labetalol, glucose, dextrose bolus **OR** dextrose bolus, glucagon (rDNA), dextrose, sodium chloride flush, sodium chloride, polyethylene glycol, acetaminophen **OR** acetaminophen, prochlorperazine, HYDROmorphone      Intake/Output Summary (Last 24 hours) at 2/4/2023 1253  Last data filed at 2/4/2023 1004  Gross per 24 hour   Intake 390 ml   Output 0 ml   Net 390 ml         Physical Exam Performed:    BP (!) 148/76   Pulse 60   Temp 97.5 °F (36.4 °C) (Oral)   Resp 16   Ht 6' 1\" (1.854 m)   Wt 223 lb 8 oz (101.4 kg)   SpO2 96%   BMI 29.49 kg/m²     General appearance: No apparent distress, appears stated age and cooperative. HEENT: Pupils equal, round, and reactive to light. Conjunctivae/corneas clear. Neck: Supple, with full range of motion. No jugular venous distention. Trachea midline. Respiratory:  Normal respiratory effort. Clear to auscultation, bilaterally without Rales/Wheezes/Rhonchi. Cardiovascular: Regular rate and rhythm with normal S1/S2 without murmurs, rubs or gallops. Abdomen: Soft, non-tender, non-distended with hypoactive bowel sounds. Musculoskeletal: No clubbing, cyanosis or edema bilaterally. Full range of motion without deformity. Skin: Skin color, texture, turgor normal.  No rashes or lesions. Neurologic:  Neurovascularly intact without any focal sensory/motor deficits.  Cranial nerves: II-XII intact, grossly non-focal.  Psychiatric: Alert and oriented, thought content appropriate, normal insight  Capillary Refill: Brisk, 3 seconds, normal   Peripheral Pulses: +2 palpable, equal bilaterally       Labs:   Recent Labs     02/01/23  1728 02/02/23  0545   WBC 9.4 8.4   HGB 13.7 13.0*   HCT 42.2 39.0*    210       Recent Labs     02/01/23  1728 02/02/23  0545    141   K 3.4* 4.0   CL 97* 106   CO2 24 26   BUN 25* 26*   CREATININE 1.2 1.3   CALCIUM 9.7 9.5       Recent Labs     02/01/23  1728 02/02/23  0545   AST 19 16   ALT 17 16   BILITOT 0.8 0.6   ALKPHOS 56 50       Recent Labs     02/02/23  0545   INR 1.11       No results for input(s): CKTOTAL, TROPONINI in the last 72 hours. Urinalysis:      Lab Results   Component Value Date/Time    NITRU Negative 02/03/2023 06:47 PM    WBCUA 3-5 02/03/2023 06:47 PM    BACTERIA Rare 02/03/2023 06:47 PM    RBCUA 0-2 02/03/2023 06:47 PM    BLOODU Negative 02/03/2023 06:47 PM    SPECGRAV 1.020 02/03/2023 06:47 PM    GLUCOSEU Negative 02/03/2023 06:47 PM       Radiology:  XR ABDOMEN (2 VIEWS)   Final Result   Improving but incompletely resolved small bowel distension. XR ACUTE ABD SERIES CHEST 1 VW   Final Result   Persistent partial small bowel obstruction. No pneumoperitoneum. No acute finding noted in the chest.         XR ABDOMEN (KUB) (SINGLE AP VIEW)   Final Result   Enteric tube tip and side hole project at the gastric body. CT ABDOMEN PELVIS W IV CONTRAST Additional Contrast? None   Final Result   Findings compatible with small-bowel obstruction, with a sharp zone of   transition along the ventral abdominal wall, almost certainly secondary to an   adhesion. IP CONSULT TO GENERAL SURGERY  IP CONSULT TO GENERAL SURGERY  IP CONSULT TO NEPHROLOGY    Assessment/Plan:    Active Hospital Problems    Diagnosis     Hyperglycemia due to type 2 diabetes mellitus ([facility] [address] [E11.65]      Priority: Medium    Hypertension [I10]      Priority: Medium    SBO (small bowel obstruction) ([facility] [address] [K56.609]      Priority: Medium     1. Abdominal pain secondary to small bowel obstruction. Small bowel obstruction improving. NG tube removed. We will advance diet if okay with surgery. 2.  Hypertension. We will continue to monitor his blood pressure. Continue valsartan, Cardura and diltiazem. Nephrology consulted to help assist with blood pressure control. Blood pressure improved  3. FEN. Monitor electrolytes and correct as needed. Discontinue IV fluids.   Labs in AM.  4.  Type 2 diabetes. We will continue insulin sliding scale. Monitor closely. DVT Prophylaxis: Patient will have SCDs ordered. Diet: ADULT DIET;  Dysphagia - Soft and Bite Sized  Code Status: Full Code  PT/OT Eval Status: Up with assistance PT OT pending    Dispo -home when stable in AM?    Appropriate for A1 Discharge Unit: No      Ahmet Chapman MD

## 2023-02-04 NOTE — PROGRESS NOTES
Shift assessments completed and charted. BP better controlled to this date, VSS, on RA, not in distress. Diet advanced for lunch, patient tolerated well. Denies n/v/d/ abd discomfort. Will continue to monitor.

## 2023-02-04 NOTE — PROGRESS NOTES
Interval History and plan:     Blood pressure getting better  Off of NG tube now  Creatinine stable at 1.3      Plan:    Continue current blood pressure medicine  Will follow-up outpatient after discharge  Explained that blood pressure can go lower after several days - to check blood pressure multiple times a day at home and also to call if he gets lightheaded blood pressure goes too high or too low etc                     Assessment :       Hypertension   BP: (124-148)/(73-76)  Heart Rate:  [60-63]   BP goal inpatient 130-140 systolic inpatient  Uncontrolled  Multiple medication changed as outpatient  Because of GI upset/possible obstruction vomiting and not absorbing current medications  Blood pressure as high as 197 systolic and 87 diastolic this hospitalization  On diltiazem, valsartan, doxazosin, also on hydrocodone chlorothiazide at home  History of the smoking    CKD Stage IIIa  Creatinine 1.2 on consult  Creatinine 1.34 on 1/30/2020  Creatinine 1.5 on 10/22  Creatinine 1.34 on 6/22  Currently kidney disease likely due to  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        Nausea vomiting/possible bowel obstruction  COVID-19 positive test (U07.1, COVID-19) with Acute Respiratory Distress Syndrome (ARDS) (J80, ARDS)  (If respiratory failure or sepsis present, add as separate assessment)          New onset diabetes mellitus-uncontrolled    [facility] would like to thank Juan Elaine MD   for opportunity to serve this patient      Please call with questions at-   24 Hrs Answering service [phone] or  7 am- 5 pm via Perfect serve or cell phone  Dr.Sudhir Alejandra MD          CC/reason for consult :     Given hypertension     HPI :     Zachery Gómez is a 77 y.o. male presented to   the hospital on 2/1/2023 with pain abdomen, nausea vomiting for a day.  He has NG tube inserted.  He is known to have hypertension for many years.  He also has a strong family history of hypertension.  Denies  taking any Aleve Motrin naproxen at home. 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 He has colostomy. His blood pressure has been uncontrolled for last several days to weeks now. His primary care physician was planning to refer to me for hypertension management. Was unable to take blood pressure medicine yesterday and was given IV      ROS:     Seen with-spouse    positives in bold   Constitutional:  fever, chills, weakness, weight change, fatigue  Skin:  rash, pruritus, hair loss, bruising, dry skin, petechiae  Head, Face, Neck   headaches, swelling,  cervical adenopathy  Respiratory: shortness of breath, cough, or wheezing  Cardiovascular: chest pain, palpitations, dizzy, edema  Gastrointestinal: nausea, vomiting, diarrhea, constipation,belly pain    Yellow skin, blood in stool  Musculoskeletal:  back pain, muscle weakness, gait problems,       joint pain or swelling. Genitourinary:  dysuria, poor urine flow, flank pain, blood in urine  Neurologic:  vertigo, TIA'S, syncope, seizures, focal weakness  Psychosocial:  insomnia, anxiety, or depression. Additional positive findings:                    All other remaining systems are negative or unable to obtain        PMH/PSH/SH/Family History:     Past Medical History:   Diagnosis Date    Cancer ([facility] [address]     Diabetes mellitus ([facility] [address]     Hyperlipidemia     Hypertension     Thyroid disease        Past Surgical History:   Procedure Laterality Date    ABDOMEN SURGERY      TOTAL ABDOMINAL COLECTOMY,ILEO-RECTAL ANASTOMIOSIS, WITH TAKEDOWN OF COLOVESICAL FISTULA AND ABSCESS DRAINAGE WITH COLOPROCTOSTOMY, OMENTECOMY, INTRAOPERATIVE FLEXIBLE SIGMOIDOSCOPY 11/2018 dr. Heather Sigala, ESOPHAGUS          reports that he has quit smoking. His smoking use included cigarettes. He has never used smokeless tobacco. He reports that he does not currently use alcohol.    family history is not on file.          Medication:     Current Facility-Administered Medications: insulin lispro (HUMALOG) injection vial 0-4 Units, 0-4 Units, SubCUTAneous, TID WC  insulin lispro (HUMALOG) injection vial 0-4 Units, 0-4 Units, SubCUTAneous, Nightly  hydrALAZINE (APRESOLINE) injection 10 mg, 10 mg, IntraVENous, Q4H PRN  NIFEdipine (PROCARDIA XL) extended release tablet 30 mg, 30 mg, Oral, BID  pantoprazole (PROTONIX) injection 40 mg, 40 mg, IntraVENous, Daily  labetalol (NORMODYNE;TRANDATE) injection 10 mg, 10 mg, IntraVENous, Q4H PRN  levothyroxine (SYNTHROID) tablet 125 mcg, 125 mcg, Oral, Daily  valsartan (DIOVAN) tablet 320 mg, 320 mg, Oral, Daily  glucose chewable tablet 16 g, 4 tablet, Oral, PRN  dextrose bolus 10% 125 mL, 125 mL, IntraVENous, PRN **OR** dextrose bolus 10% 250 mL, 250 mL, IntraVENous, PRN  glucagon (rDNA) injection 1 mg, 1 mg, SubCUTAneous, PRN  dextrose 10 % infusion, , IntraVENous, Continuous PRN  sodium chloride flush 0.9 % injection 5-40 mL, 5-40 mL, IntraVENous, 2 times per day  sodium chloride flush 0.9 % injection 5-40 mL, 5-40 mL, IntraVENous, PRN  0.9 % sodium chloride infusion, , IntraVENous, PRN  polyethylene glycol (GLYCOLAX) packet 17 g, 17 g, Oral, Daily PRN  acetaminophen (TYLENOL) tablet 650 mg, 650 mg, Oral, Q6H PRN **OR** acetaminophen (TYLENOL) suppository 650 mg, 650 mg, Rectal, Q6H PRN  prochlorperazine (COMPAZINE) injection 10 mg, 10 mg, IntraVENous, Q6H PRN  HYDROmorphone (DILAUDID) injection 0.5 mg, 0.5 mg, IntraVENous, Q4H PRN       Vitals :     Vitals:    02/04/23 1003   BP: (!) 148/76   Pulse: 60   Resp:    Temp:    SpO2:        I & O :       Intake/Output Summary (Last 24 hours) at 2/4/2023 1331  Last data filed at 2/4/2023 1311  Gross per 24 hour   Intake 750 ml   Output 0 ml   Net 750 ml          Physical Examination :     General appearance: Alert, oriented, pleasant, obese  JVD-not raised  Respiratory: Normal  Cardiovascular:  Ausculation- No M/R/G, Edema none  Abdomen: -  soft  Musculoskeletal:  NA  Skin: sudheer- none  Other findings: na  NG tube at initial consult  Has colostomy     LABS:     Recent Labs     02/01/23  1728 02/02/23  0545   WBC 9.4 8.4   HGB 13.7 13.0*   HCT 42.2 39.0*    210       Recent Labs     02/01/23  1728 02/02/23  0545    141   K 3.4* 4.0   CL 97* 106   CO2 24 26   BUN 25* 26*   CREATININE 1.2 1.3   GLUCOSE 166* 143*   MG 2.00  --               Thanks,   [facility]  [address], [facility]  Office: [phone]  Fax: [phone]

## 2023-02-05 VITALS
WEIGHT: 223.5 LBS | RESPIRATION RATE: 14 BRPM | HEIGHT: 73 IN | HEART RATE: 72 BPM | TEMPERATURE: 97.6 F | SYSTOLIC BLOOD PRESSURE: 137 MMHG | OXYGEN SATURATION: 96 % | DIASTOLIC BLOOD PRESSURE: 80 MMHG | BODY MASS INDEX: 29.62 KG/M2

## 2023-02-05 LAB
ANION GAP SERPL CALCULATED.3IONS-SCNC: 11 MMOL/L (ref 3–16)
BASOPHILS ABSOLUTE: 0 K/UL (ref 0–0.2)
BASOPHILS RELATIVE PERCENT: 0.7 %
BUN BLDV-MCNC: 21 MG/DL (ref 7–20)
CALCIUM SERPL-MCNC: 8.7 MG/DL (ref 8.3–10.6)
CHLORIDE BLD-SCNC: 105 MMOL/L (ref 99–110)
CO2: 23 MMOL/L (ref 21–32)
CREAT SERPL-MCNC: 1 MG/DL (ref 0.8–1.3)
EOSINOPHILS ABSOLUTE: 0.1 K/UL (ref 0–0.6)
EOSINOPHILS RELATIVE PERCENT: 2.8 %
GFR SERPL CREATININE-BSD FRML MDRD: >60 ML/MIN/{1.73_M2}
GLUCOSE BLD-MCNC: 114 MG/DL (ref 70–99)
GLUCOSE BLD-MCNC: 115 MG/DL (ref 70–99)
HCT VFR BLD CALC: 37.1 % (ref 40.5–52.5)
HEMOGLOBIN: 12.5 G/DL (ref 13.5–17.5)
LYMPHOCYTES ABSOLUTE: 0.5 K/UL (ref 1–5.1)
LYMPHOCYTES RELATIVE PERCENT: 10.5 %
MCH RBC QN AUTO: 30.1 PG (ref 26–34)
MCHC RBC AUTO-ENTMCNC: 33.8 G/DL (ref 31–36)
MCV RBC AUTO: 89.2 FL (ref 80–100)
MONOCYTES ABSOLUTE: 0.4 K/UL (ref 0–1.3)
MONOCYTES RELATIVE PERCENT: 8.3 %
NEUTROPHILS ABSOLUTE: 3.9 K/UL (ref 1.7–7.7)
NEUTROPHILS RELATIVE PERCENT: 77.7 %
PDW BLD-RTO: 14.6 % (ref 12.4–15.4)
PERFORMED ON: ABNORMAL
PLATELET # BLD: 191 K/UL (ref 135–450)
PMV BLD AUTO: 7.1 FL (ref 5–10.5)
POTASSIUM REFLEX MAGNESIUM: 3.6 MMOL/L (ref 3.5–5.1)
RBC # BLD: 4.15 M/UL (ref 4.2–5.9)
SODIUM BLD-SCNC: 139 MMOL/L (ref 136–145)
WBC # BLD: 5 K/UL (ref 4–11)

## 2023-02-05 PROCEDURE — 6360000002 HC RX W HCPCS: Performed by: NURSE PRACTITIONER

## 2023-02-05 PROCEDURE — 6370000000 HC RX 637 (ALT 250 FOR IP): Performed by: NURSE PRACTITIONER

## 2023-02-05 PROCEDURE — 80048 BASIC METABOLIC PNL TOTAL CA: CPT

## 2023-02-05 PROCEDURE — 36415 COLL VENOUS BLD VENIPUNCTURE: CPT

## 2023-02-05 PROCEDURE — C9113 INJ PANTOPRAZOLE SODIUM, VIA: HCPCS | Performed by: NURSE PRACTITIONER

## 2023-02-05 PROCEDURE — 6370000000 HC RX 637 (ALT 250 FOR IP): Performed by: INTERNAL MEDICINE

## 2023-02-05 PROCEDURE — 85025 COMPLETE CBC W/AUTO DIFF WBC: CPT

## 2023-02-05 PROCEDURE — 2580000003 HC RX 258: Performed by: NURSE PRACTITIONER

## 2023-02-05 RX ORDER — NIFEDIPINE 30 MG/1
30 TABLET, EXTENDED RELEASE ORAL 2 TIMES DAILY
Qty: 30 TABLET | Refills: 3 | Status: SHIPPED | OUTPATIENT
Start: 2023-02-05

## 2023-02-05 RX ADMIN — SODIUM CHLORIDE, PRESERVATIVE FREE 10 ML: 5 INJECTION INTRAVENOUS at 09:04

## 2023-02-05 RX ADMIN — NIFEDIPINE 30 MG: 30 TABLET, FILM COATED, EXTENDED RELEASE ORAL at 09:03

## 2023-02-05 RX ADMIN — PANTOPRAZOLE SODIUM 40 MG: 40 INJECTION, POWDER, FOR SOLUTION INTRAVENOUS at 09:03

## 2023-02-05 RX ADMIN — LEVOTHYROXINE SODIUM 125 MCG: 0.12 TABLET ORAL at 06:42

## 2023-02-05 RX ADMIN — VALSARTAN 320 MG: 80 TABLET, FILM COATED ORAL at 09:03

## 2023-02-05 NOTE — DISCHARGE SUMMARY
Hospital Medicine Discharge Summary    Patient ID: Romana Beech      Patient's PCP: Leopoldo Garcia MD    Admit Date: 2/1/2023     Discharge Date:   2/5/2023    Admitting Provider: Kenisha Corcoran MD     Discharge Provider: Wilian Nair MD     Discharge Diagnoses: Active Hospital Problems    Diagnosis     Hyperglycemia due to type 2 diabetes mellitus ([facility] [address] [E11.65]      Priority: Medium    Hypertension [I10]      Priority: Medium    SBO (small bowel obstruction) ([facility] [address] [K56.609]      Priority: Medium       The patient was seen and examined on day of discharge and this discharge summary is in conjunction with any daily progress note from day of discharge. Hospital Course:     Romana Beech is a 78-year-old male significant past medical history of hypertension, hyperlipidemia, type 2 diabetes controlled with diet, and hypothyroidism who [place] [facility] [place] with complaint of abdominal pain, nausea, and vomiting that began today. He also notes has not had a bowel movement in approximate 24 hours which she endorses a very regular BM schedule. 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 He does not have a colostomy, states has not had any issues since his surgery. Denies any fever at home. His evaluation here included laboratory studies, EKG, KUB, and CT of the abdomen pelvis with IV contrast.  CT showed findings compatible with small bowel obstruction with short sharp zone of transition along the ventral abdominal wall likely secondary to adhesion. An NG tube was placed by the ED. Pertinent abnormal lab values include potassium 3.4, BUN 25, blood sugar 166, and lipase 65. Hospital team consulted to admit this patient for small bowel obstruction. Patient denies prior episode of small bowel obstruction. Patient treated for:  1. Abdominal pain secondary to small bowel obstruction. Small bowel obstruction improving. Patient tolerated regular diet. Plan to discharge home. 2.  Hypertension. We will continue to monitor his blood pressure. Continue valsartan, Cardura and diltiazem. Nephrology consulted to help assist with blood pressure control. Blood pressure good on discharge. Follow-up with nephrology as outpatient. 3.  FEN. Monitor electrolytes and correct as needed. Discontinue IV fluids. Labs improved. 4.  Type 2 diabetes. We will continue insulin sliding scale. Patient's blood sugars are very well controlled. Patient will follow home regimen on discharge. Physical Exam Performed:     /80   Pulse 72   Temp 97.6 °F (36.4 °C) (Oral)   Resp 14   Ht 6' 1\" (1.854 m)   Wt 223 lb 8 oz (101.4 kg)   SpO2 96%   BMI 29.49 kg/m²       General appearance:  No apparent distress, appears stated age and cooperative. HEENT:  Normal cephalic, atraumatic without obvious deformity. Pupils equal, round, and reactive to light. Extra ocular muscles intact. Conjunctivae/corneas clear. Neck: Supple, with full range of motion. No jugular venous distention. Trachea midline. Respiratory:  Normal respiratory effort. Clear to auscultation, bilaterally without Rales/Wheezes/Rhonchi. Cardiovascular:  Regular rate and rhythm with normal S1/S2 without murmurs, rubs or gallops. Abdomen: Soft, non-tender, non-distended with normal bowel sounds. Musculoskeletal:  No clubbing, cyanosis or edema bilaterally. Full range of motion without deformity. Skin: Skin color, texture, turgor normal.  No rashes or lesions. Neurologic:  Neurovascularly intact without any focal sensory/motor deficits. Cranial nerves: II-XII intact, grossly non-focal.  Psychiatric:  Alert and oriented, thought content appropriate, normal insight  Capillary Refill: Brisk,< 3 seconds   Peripheral Pulses: +2 palpable, equal bilaterally       Labs:  For convenience and continuity at follow-up the following most recent labs are provided:      CBC:    Lab Results   Component Value Date/Time    WBC 5.0 02/05/2023 06:00 AM    HGB 12.5 02/05/2023 06:00 AM    HCT 37.1 02/05/2023 06:00 AM     02/05/2023 06:00 AM       Renal:    Lab Results   Component Value Date/Time     02/05/2023 05:59 AM    K 3.6 02/05/2023 05:59 AM     02/05/2023 05:59 AM    CO2 23 02/05/2023 05:59 AM    BUN 21 02/05/2023 05:59 AM    CREATININE 1.0 02/05/2023 05:59 AM    CALCIUM 8.7 02/05/2023 05:59 AM         Significant Diagnostic Studies    Radiology:   XR ABDOMEN (2 VIEWS)   Final Result   Improving but incompletely resolved small bowel distension. XR ACUTE ABD SERIES CHEST 1 VW   Final Result   Persistent partial small bowel obstruction. No pneumoperitoneum. No acute finding noted in the chest.         XR ABDOMEN (KUB) (SINGLE AP VIEW)   Final Result   Enteric tube tip and side hole project at the gastric body. CT ABDOMEN PELVIS W IV CONTRAST Additional Contrast? None   Final Result   Findings compatible with small-bowel obstruction, with a sharp zone of   transition along the ventral abdominal wall, almost certainly secondary to an   adhesion.          VL Renal Arterial Duplex Complete    (Results Pending)          Consults:     IP CONSULT TO GENERAL SURGERY  IP CONSULT TO GENERAL SURGERY  IP CONSULT TO NEPHROLOGY    Disposition: Home    Condition at Discharge: Stable    Discharge Instructions/Follow-up:     Follow up with Dr. Zunilda Daniel MD in 2 week(s)  Specialty: Internal Medicine  Post hospital follow up [address] [address] [phone]          Follow up with Dr. Flores Stephens MD in 3 week(s)  Specialty: Nephrology  Post hospital follow up [address] [facility]  [phone]       Code Status:  Full Code     Activity: activity as tolerated    Diet: regular diet      Discharge Medications:     Current Discharge Medication List             Details NIFEdipine (PROCARDIA XL) 30 MG extended release tablet Take 1 tablet by mouth in the morning and at bedtime  Qty: 30 tablet, Refills: 3                Details   dilTIAZem (DILACOR XR) 240 MG extended release capsule Take 240 mg by mouth daily      blood glucose test strips (ACCU-CHEK MARLON PLUS) strip Use to check blood sugar once daily  DX: E11.9      hydroCHLOROthiazide (HYDRODIURIL) 25 MG tablet TAKE 1 TABLET BY MOUTH EVERY DAY IN THE MORNING      potassium chloride (KLOR-CON M) 20 MEQ extended release tablet TAKE 1 TABLET BY MOUTH TWICE A DAY      sildenafil (VIAGRA) 100 MG tablet TAKE 1/2 TABLET BY MOUTH DAILY AS NEEDED      valsartan (DIOVAN) 320 MG tablet TAKE 1 TABLET BY MOUTH EVERY DAY      aspirin 81 MG EC tablet Take 81 mg by mouth daily      doxazosin (CARDURA) 8 MG tablet       fluticasone (FLONASE) 50 MCG/ACT nasal spray 1 spray by Nasal route daily as needed      levothyroxine (SYNTHROID) 125 MCG tablet TAKE 1 TABLET BY MOUTH EVERY DAY             Time Spent on discharge: 35 minutes in the examination, evaluation, counseling and review of medications and discharge plan. Signed:    Huma Amaro MD   2/5/2023      Thank you Neela Little MD for the opportunity to be involved in this patient's care. If you have any questions or concerns, please feel free to contact me at [phone].

## 2023-02-05 NOTE — PROGRESS NOTES
Interval History and plan:     Cr better  BP is good  No dizziness      Plan:    Ok for DC  Follow up outpatient                      Assessment :       Hypertension   BP: (137)/(80)  Heart Rate:  [72]   BP goal inpatient [id number] systolic inpatient  Uncontrolled  Multiple medication changed as outpatient  Because of GI upset/possible obstruction vomiting and not absorbing current medications  Blood pressure as high as 746 systolic and 87 diastolic this hospitalization  On diltiazem, valsartan, doxazosin, also on hydrocodone chlorothiazide at home  History of the smoking    CKD Stage IIIa  Creatinine 1.2 on consult  Creatinine 1.34 on 1/30/2020  Creatinine 1.5 on 10/22  Creatinine 1.34 on 6/22  Currently kidney disease likely due to  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        Nausea vomiting/possible bowel obstruction  COVID-19 positive test (U07.1, COVID-19) with Acute Respiratory Distress Syndrome (ARDS) (J80, ARDS)  (If respiratory failure or sepsis present, add as separate assessment)          New onset diabetes mellitus-uncontrolled    [facility] would like to thank No att. providers found   for opportunity to serve this patient      Please call with questions at-   24 Hrs Answering service [phone] or  7 am- 5 pm via Perfect serve or cell phone  Ernesto Cutler MD          CC/reason for consult :     Given hypertension     HPI :     Ric Correia is a 69 y.o. male presented to   the hospital on 2/1/2023 with pain abdomen, nausea vomiting for a day. He has NG tube inserted. He is known to have hypertension for many years. He also has a strong family history of hypertension. Denies taking any Aleve Motrin naproxen at home. 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 He has colostomy. His blood pressure has been uncontrolled for last several days to weeks now. His primary care physician was planning to refer to me for hypertension management.     Was unable to take blood pressure medicine yesterday and was given IV      ROS:     Seen with-spouse    positives in bold   Constitutional:  fever, chills, weakness, weight change, fatigue  Skin:  rash, pruritus, hair loss, bruising, dry skin, petechiae  Head, Face, Neck   headaches, swelling,  cervical adenopathy  Respiratory: shortness of breath, cough, or wheezing  Cardiovascular: chest pain, palpitations, dizzy, edema  Gastrointestinal: nausea, vomiting, diarrhea, constipation,belly pain    Yellow skin, blood in stool  Musculoskeletal:  back pain, muscle weakness, gait problems,       joint pain or swelling. Genitourinary:  dysuria, poor urine flow, flank pain, blood in urine  Neurologic:  vertigo, TIA'S, syncope, seizures, focal weakness  Psychosocial:  insomnia, anxiety, or depression. Additional positive findings:                    All other remaining systems are negative or unable to obtain        PMH/PSH/SH/Family History:     Past Medical History:   Diagnosis Date    Cancer ([facility] [address]     Diabetes mellitus ([facility] [address]     Hyperlipidemia     Hypertension     Thyroid disease        Past Surgical History:   Procedure Laterality Date    ABDOMEN SURGERY      TOTAL ABDOMINAL COLECTOMY,ILEO-RECTAL ANASTOMIOSIS, WITH TAKEDOWN OF COLOVESICAL FISTULA AND ABSCESS DRAINAGE WITH COLOPROCTOSTOMY, OMENTECOMY, INTRAOPERATIVE FLEXIBLE SIGMOIDOSCOPY 11/2018 dr. Nba Prince, [facility]          reports that he has quit smoking. His smoking use included cigarettes. He has never used smokeless tobacco. He reports that he does not currently use alcohol.    family history is not on file.          Medication:     Current Facility-Administered Medications: insulin lispro (HUMALOG) injection vial 0-4 Units, 0-4 Units, SubCUTAneous, TID WC  insulin lispro (HUMALOG) injection vial 0-4 Units, 0-4 Units, SubCUTAneous, Nightly  hydrALAZINE (APRESOLINE) injection 10 mg, 10 mg, IntraVENous, Q4H PRN  NIFEdipine (PROCARDIA XL) extended release tablet 30 mg, 30 mg, Oral, BID  pantoprazole (PROTONIX) injection 40 mg, 40 mg, IntraVENous, Daily  labetalol (NORMODYNE;TRANDATE) injection 10 mg, 10 mg, IntraVENous, Q4H PRN  levothyroxine (SYNTHROID) tablet 125 mcg, 125 mcg, Oral, Daily  valsartan (DIOVAN) tablet 320 mg, 320 mg, Oral, Daily  glucose chewable tablet 16 g, 4 tablet, Oral, PRN  dextrose bolus 10% 125 mL, 125 mL, IntraVENous, PRN **OR** dextrose bolus 10% 250 mL, 250 mL, IntraVENous, PRN  glucagon (rDNA) injection 1 mg, 1 mg, SubCUTAneous, PRN  dextrose 10 % infusion, , IntraVENous, Continuous PRN  sodium chloride flush 0.9 % injection 5-40 mL, 5-40 mL, IntraVENous, 2 times per day  sodium chloride flush 0.9 % injection 5-40 mL, 5-40 mL, IntraVENous, PRN  0.9 % sodium chloride infusion, , IntraVENous, PRN  polyethylene glycol (GLYCOLAX) packet 17 g, 17 g, Oral, Daily PRN  acetaminophen (TYLENOL) tablet 650 mg, 650 mg, Oral, Q6H PRN **OR** acetaminophen (TYLENOL) suppository 650 mg, 650 mg, Rectal, Q6H PRN  prochlorperazine (COMPAZINE) injection 10 mg, 10 mg, IntraVENous, Q6H PRN  HYDROmorphone (DILAUDID) injection 0.5 mg, 0.5 mg, IntraVENous, Q4H PRN       Vitals :     Vitals:    02/05/23 0754   BP: 137/80   Pulse: 72   Resp: 14   Temp: 97.6 °F (36.4 °C)   SpO2: 96%       I & O :       Intake/Output Summary (Last 24 hours) at 2/5/2023 1137  Last data filed at 2/4/2023 2041  Gross per 24 hour   Intake 370 ml   Output --   Net 370 ml          Physical Examination :     General appearance: Alert, oriented, pleasant, obese  JVD-not raised  Respiratory: Normal  Cardiovascular:  Ausculation- No M/R/G, Edema none  Abdomen: -  soft  Musculoskeletal:  NA  Skin: rashes- none  Other findings: na  NG tube at initial consult  Has colostomy     LABS:     Recent Labs     02/05/23  0600   WBC 5.0   HGB 12.5*   HCT 37.1*          Recent Labs     02/05/23  0559      K 3.6      CO2 23   BUN 21*   CREATININE 1.0   GLUCOSE 114* Thanks,   [facility]  [address]  Office: [phone]  Fax: [phone]

## 2023-02-05 NOTE — PLAN OF CARE
Problem: Pain  Goal: Verbalizes/displays adequate comfort level or baseline comfort level  2/5/2023 1044 by Benjamin Ge RN  Outcome: Adequate for Discharge     Problem: Safety - Adult  Goal: Free from fall injury  Outcome: Adequate for Discharge     Problem: Gastrointestinal - Adult  Goal: Minimal or absence of nausea and vomiting  Outcome: Adequate for Discharge     Problem: Gastrointestinal - Adult  Goal: Maintains or returns to baseline bowel function  Outcome: Adequate for Discharge     Problem: Gastrointestinal - Adult  Goal: Maintains adequate nutritional intake  Outcome: Adequate for Discharge     Problem: Metabolic/Fluid and Electrolytes - Adult  Goal: Electrolytes maintained within normal limits  Outcome: Adequate for Discharge     Problem: Metabolic/Fluid and Electrolytes - Adult  Goal: Hemodynamic stability and optimal renal function maintained  Outcome: Adequate for Discharge     Problem: Metabolic/Fluid and Electrolytes - Adult  Goal: Glucose maintained within prescribed range  Outcome: Adequate for Discharge     Problem: Discharge Planning  Goal: Discharge to home or other facility with appropriate resources  Outcome: Adequate for Discharge     Problem: Chronic Conditions and Co-morbidities  Goal: Patient's chronic conditions and co-morbidity symptoms are monitored and maintained or improved  Outcome: Adequate for Discharge

## 2023-02-05 NOTE — PROGRESS NOTES
[facility]    PATIENT NAME: Cathy Alvarez     TODAY'S DATE: 2/4/2023    CHIEF COMPLAINT: none    INTERVAL HISTORY/HPI:    Pt continues to feel better, no pain, is taking PO intake well, and having bowel function. OBJECTIVE:  VITALS:  BP (!) 145/76   Pulse 68   Temp 97.6 °F (36.4 °C) (Oral)   Resp 16   Ht 6' 1\" (1.854 m)   Wt 223 lb 8 oz (101.4 kg)   SpO2 96%   BMI 29.49 kg/m²     INTAKE/OUTPUT:    I/O last 3 completed shifts: In: 750 [P.O.:720; I.V.:30]  Out: 0   No intake/output data recorded. CONSTITUTIONAL:  awake and alert  LUNGS:     ABDOMEN:   , soft, non-distended,       Data:  CBC:   Recent Labs     02/02/23  0545   WBC 8.4   HGB 13.0*   HCT 39.0*        BMP:    Recent Labs     02/02/23  0545      K 4.0      CO2 26   BUN 26*   CREATININE 1.3   GLUCOSE 143*     Hepatic:   Recent Labs     02/02/23  0545   AST 16   ALT 16   BILITOT 0.6   ALKPHOS 50     Mag:    No results for input(s): MG in the last 72 hours. Phos:   No results for input(s): PHOS in the last 72 hours.    INR:   Recent Labs     02/02/23  0545   INR 1.11       Radiology Review:         ASSESSMENT AND PLAN:  SBO, appears resolved clinically   - diet as tolerated   - ok for d/c home from surgical perspective    Electronically signed by Estiven Fontenot MD

## 2023-02-07 LAB
ALDOSTERONE: 22.4 NG/DL
METANEPH/PLASMA INTERP: NORMAL
METANEPHRINE FREE PLASMA: 0.11 NMOL/L (ref 0–0.49)
NORMETANEPHRINE FREE PLASMA: 0.25 NMOL/L (ref 0–0.89)

## 2023-02-08 LAB — RENIN ACTIVITY: 0.5 NG/ML/HR

## 2025-05-24 ENCOUNTER — APPOINTMENT (OUTPATIENT)
Dept: CT IMAGING | Age: 80
DRG: 390 | End: 2025-05-24
Payer: COMMERCIAL

## 2025-05-24 ENCOUNTER — HOSPITAL ENCOUNTER (INPATIENT)
Age: 80
LOS: 4 days | Discharge: HOME | DRG: 390 | End: 2025-05-28
Attending: EMERGENCY MEDICINE
Payer: COMMERCIAL

## 2025-05-24 DIAGNOSIS — R10.84 GENERALIZED ABDOMINAL PAIN: Primary | ICD-10-CM

## 2025-05-24 DIAGNOSIS — R11.14 BILIOUS VOMITING WITH NAUSEA: ICD-10-CM

## 2025-05-24 PROBLEM — K56.7 ILEUS (HCC): Status: ACTIVE | Noted: 2025-05-24

## 2025-05-24 LAB
ALBUMIN SERPL-MCNC: 4.3 G/DL (ref 3.4–5)
ALBUMIN/GLOB SERPL: 1.7 {RATIO} (ref 1.1–2.2)
ALP SERPL-CCNC: 44 U/L (ref 40–129)
ALT SERPL-CCNC: 23 U/L (ref 10–40)
ANION GAP SERPL CALCULATED.3IONS-SCNC: 16 MMOL/L (ref 3–16)
AST SERPL-CCNC: 26 U/L (ref 15–37)
BASOPHILS # BLD: 0 K/UL (ref 0–0.2)
BASOPHILS NFR BLD: 0.1 %
BILIRUB SERPL-MCNC: 0.5 MG/DL (ref 0–1)
BUN SERPL-MCNC: 45 MG/DL (ref 7–20)
CALCIUM SERPL-MCNC: 10.2 MG/DL (ref 8.3–10.6)
CHLORIDE SERPL-SCNC: 102 MMOL/L (ref 99–110)
CO2 SERPL-SCNC: 23 MMOL/L (ref 21–32)
CREAT SERPL-MCNC: 1.9 MG/DL (ref 0.8–1.3)
DEPRECATED RDW RBC AUTO: 14.9 % (ref 12.4–15.4)
EOSINOPHIL # BLD: 0 K/UL (ref 0–0.6)
EOSINOPHIL NFR BLD: 0 %
GFR SERPLBLD CREATININE-BSD FMLA CKD-EPI: 36 ML/MIN/{1.73_M2}
GLUCOSE BLD-MCNC: 106 MG/DL (ref 70–99)
GLUCOSE BLD-MCNC: 127 MG/DL (ref 70–99)
GLUCOSE SERPL-MCNC: 190 MG/DL (ref 70–99)
HCT VFR BLD AUTO: 38.6 % (ref 40.5–52.5)
HGB BLD-MCNC: 12.8 G/DL (ref 13.5–17.5)
LIPASE SERPL-CCNC: 41 U/L (ref 13–60)
LYMPHOCYTES # BLD: 0.4 K/UL (ref 1–5.1)
LYMPHOCYTES NFR BLD: 4.8 %
MCH RBC QN AUTO: 29.6 PG (ref 26–34)
MCHC RBC AUTO-ENTMCNC: 33.2 G/DL (ref 31–36)
MCV RBC AUTO: 89.3 FL (ref 80–100)
MONOCYTES # BLD: 0.1 K/UL (ref 0–1.3)
MONOCYTES NFR BLD: 1.2 %
NEUTROPHILS # BLD: 7.5 K/UL (ref 1.7–7.7)
NEUTROPHILS NFR BLD: 93.9 %
PERFORMED ON: ABNORMAL
PERFORMED ON: ABNORMAL
PLATELET # BLD AUTO: 235 K/UL (ref 135–450)
PMV BLD AUTO: 8.3 FL (ref 5–10.5)
POTASSIUM SERPL-SCNC: 3.9 MMOL/L (ref 3.5–5.1)
PROT SERPL-MCNC: 6.9 G/DL (ref 6.4–8.2)
RBC # BLD AUTO: 4.32 M/UL (ref 4.2–5.9)
SODIUM SERPL-SCNC: 141 MMOL/L (ref 136–145)
WBC # BLD AUTO: 7.9 K/UL (ref 4–11)

## 2025-05-24 PROCEDURE — 6360000002 HC RX W HCPCS

## 2025-05-24 PROCEDURE — 2500000003 HC RX 250 WO HCPCS

## 2025-05-24 PROCEDURE — 74176 CT ABD & PELVIS W/O CONTRAST: CPT

## 2025-05-24 PROCEDURE — 96375 TX/PRO/DX INJ NEW DRUG ADDON: CPT

## 2025-05-24 PROCEDURE — 80053 COMPREHEN METABOLIC PANEL: CPT

## 2025-05-24 PROCEDURE — 96374 THER/PROPH/DIAG INJ IV PUSH: CPT

## 2025-05-24 PROCEDURE — 99285 EMERGENCY DEPT VISIT HI MDM: CPT

## 2025-05-24 PROCEDURE — 83690 ASSAY OF LIPASE: CPT

## 2025-05-24 PROCEDURE — 85025 COMPLETE CBC W/AUTO DIFF WBC: CPT

## 2025-05-24 PROCEDURE — 1200000000 HC SEMI PRIVATE

## 2025-05-24 PROCEDURE — 96361 HYDRATE IV INFUSION ADD-ON: CPT

## 2025-05-24 PROCEDURE — 2580000003 HC RX 258

## 2025-05-24 RX ORDER — SODIUM CHLORIDE 9 MG/ML
INJECTION, SOLUTION INTRAVENOUS CONTINUOUS
Status: DISCONTINUED | OUTPATIENT
Start: 2025-05-24 | End: 2025-05-25

## 2025-05-24 RX ORDER — MORPHINE SULFATE 2 MG/ML
2 INJECTION, SOLUTION INTRAMUSCULAR; INTRAVENOUS
Refills: 0 | Status: DISCONTINUED | OUTPATIENT
Start: 2025-05-24 | End: 2025-05-28 | Stop reason: HOSPADM

## 2025-05-24 RX ORDER — SPIRONOLACTONE 25 MG/1
25 TABLET ORAL NIGHTLY
Status: DISCONTINUED | OUTPATIENT
Start: 2025-05-24 | End: 2025-05-28 | Stop reason: HOSPADM

## 2025-05-24 RX ORDER — ACETAMINOPHEN 650 MG/1
650 SUPPOSITORY RECTAL EVERY 6 HOURS PRN
Status: DISCONTINUED | OUTPATIENT
Start: 2025-05-24 | End: 2025-05-28 | Stop reason: HOSPADM

## 2025-05-24 RX ORDER — ENOXAPARIN SODIUM 100 MG/ML
40 INJECTION SUBCUTANEOUS DAILY
Status: DISCONTINUED | OUTPATIENT
Start: 2025-05-24 | End: 2025-05-28 | Stop reason: HOSPADM

## 2025-05-24 RX ORDER — ONDANSETRON 2 MG/ML
4 INJECTION INTRAMUSCULAR; INTRAVENOUS EVERY 6 HOURS PRN
Status: DISCONTINUED | OUTPATIENT
Start: 2025-05-24 | End: 2025-05-28 | Stop reason: HOSPADM

## 2025-05-24 RX ORDER — LEVOTHYROXINE SODIUM 125 UG/1
125 TABLET ORAL DAILY
Status: DISCONTINUED | OUTPATIENT
Start: 2025-05-24 | End: 2025-05-28 | Stop reason: HOSPADM

## 2025-05-24 RX ORDER — SODIUM CHLORIDE 0.9 % (FLUSH) 0.9 %
5-40 SYRINGE (ML) INJECTION PRN
Status: DISCONTINUED | OUTPATIENT
Start: 2025-05-24 | End: 2025-05-28 | Stop reason: HOSPADM

## 2025-05-24 RX ORDER — PANTOPRAZOLE SODIUM 40 MG/10ML
40 INJECTION, POWDER, LYOPHILIZED, FOR SOLUTION INTRAVENOUS ONCE
Status: COMPLETED | OUTPATIENT
Start: 2025-05-24 | End: 2025-05-24

## 2025-05-24 RX ORDER — SODIUM CHLORIDE 9 MG/ML
INJECTION, SOLUTION INTRAVENOUS PRN
Status: DISCONTINUED | OUTPATIENT
Start: 2025-05-24 | End: 2025-05-28 | Stop reason: HOSPADM

## 2025-05-24 RX ORDER — PANTOPRAZOLE SODIUM 40 MG/10ML
40 INJECTION, POWDER, LYOPHILIZED, FOR SOLUTION INTRAVENOUS DAILY
Status: DISCONTINUED | OUTPATIENT
Start: 2025-05-25 | End: 2025-05-28 | Stop reason: HOSPADM

## 2025-05-24 RX ORDER — ONDANSETRON 4 MG/1
4 TABLET, ORALLY DISINTEGRATING ORAL EVERY 8 HOURS PRN
Status: DISCONTINUED | OUTPATIENT
Start: 2025-05-24 | End: 2025-05-28 | Stop reason: HOSPADM

## 2025-05-24 RX ORDER — INSULIN LISPRO 100 [IU]/ML
0-4 INJECTION, SOLUTION INTRAVENOUS; SUBCUTANEOUS EVERY 6 HOURS SCHEDULED
Status: DISCONTINUED | OUTPATIENT
Start: 2025-05-24 | End: 2025-05-25

## 2025-05-24 RX ORDER — CHLORTHALIDONE 25 MG/1
25 TABLET ORAL DAILY
Status: DISCONTINUED | OUTPATIENT
Start: 2025-05-24 | End: 2025-05-28 | Stop reason: HOSPADM

## 2025-05-24 RX ORDER — POLYETHYLENE GLYCOL 3350 17 G/17G
17 POWDER, FOR SOLUTION ORAL DAILY PRN
Status: DISCONTINUED | OUTPATIENT
Start: 2025-05-24 | End: 2025-05-26

## 2025-05-24 RX ORDER — ACETAMINOPHEN 325 MG/1
650 TABLET ORAL EVERY 6 HOURS PRN
Status: DISCONTINUED | OUTPATIENT
Start: 2025-05-24 | End: 2025-05-28 | Stop reason: HOSPADM

## 2025-05-24 RX ORDER — MORPHINE SULFATE 2 MG/ML
2 INJECTION, SOLUTION INTRAMUSCULAR; INTRAVENOUS ONCE
Status: COMPLETED | OUTPATIENT
Start: 2025-05-24 | End: 2025-05-24

## 2025-05-24 RX ORDER — SODIUM CHLORIDE 0.9 % (FLUSH) 0.9 %
5-40 SYRINGE (ML) INJECTION EVERY 12 HOURS SCHEDULED
Status: DISCONTINUED | OUTPATIENT
Start: 2025-05-24 | End: 2025-05-28 | Stop reason: HOSPADM

## 2025-05-24 RX ORDER — SPIRONOLACTONE 25 MG/1
25 TABLET ORAL DAILY
Status: DISCONTINUED | OUTPATIENT
Start: 2025-05-24 | End: 2025-05-24

## 2025-05-24 RX ORDER — LABETALOL HYDROCHLORIDE 5 MG/ML
20 INJECTION, SOLUTION INTRAVENOUS EVERY 4 HOURS PRN
Status: DISCONTINUED | OUTPATIENT
Start: 2025-05-24 | End: 2025-05-27

## 2025-05-24 RX ORDER — ONDANSETRON 2 MG/ML
4 INJECTION INTRAMUSCULAR; INTRAVENOUS AS NEEDED
Status: DISCONTINUED | OUTPATIENT
Start: 2025-05-24 | End: 2025-05-24

## 2025-05-24 RX ORDER — DEXTROSE MONOHYDRATE 100 MG/ML
INJECTION, SOLUTION INTRAVENOUS CONTINUOUS PRN
Status: DISCONTINUED | OUTPATIENT
Start: 2025-05-24 | End: 2025-05-28 | Stop reason: HOSPADM

## 2025-05-24 RX ORDER — ASPIRIN 81 MG/1
81 TABLET ORAL DAILY
Status: DISCONTINUED | OUTPATIENT
Start: 2025-05-24 | End: 2025-05-28 | Stop reason: HOSPADM

## 2025-05-24 RX ORDER — PROCHLORPERAZINE EDISYLATE 5 MG/ML
10 INJECTION INTRAMUSCULAR; INTRAVENOUS EVERY 6 HOURS PRN
Status: DISCONTINUED | OUTPATIENT
Start: 2025-05-24 | End: 2025-05-28 | Stop reason: HOSPADM

## 2025-05-24 RX ORDER — MULTIVITAMIN WITH IRON
1 TABLET ORAL DAILY
COMMUNITY

## 2025-05-24 RX ORDER — 0.9 % SODIUM CHLORIDE 0.9 %
1000 INTRAVENOUS SOLUTION INTRAVENOUS ONCE
Status: COMPLETED | OUTPATIENT
Start: 2025-05-24 | End: 2025-05-24

## 2025-05-24 RX ORDER — SPIRONOLACTONE 25 MG/1
25 TABLET ORAL NIGHTLY
COMMUNITY

## 2025-05-24 RX ORDER — GLUCAGON 1 MG/ML
1 KIT INJECTION PRN
Status: DISCONTINUED | OUTPATIENT
Start: 2025-05-24 | End: 2025-05-28 | Stop reason: HOSPADM

## 2025-05-24 RX ORDER — DAPAGLIFLOZIN 10 MG/1
10 TABLET, FILM COATED ORAL EVERY MORNING
COMMUNITY

## 2025-05-24 RX ORDER — CHLORTHALIDONE 25 MG/1
25 TABLET ORAL DAILY
COMMUNITY

## 2025-05-24 RX ORDER — VALSARTAN 80 MG/1
325 TABLET ORAL DAILY
Status: DISCONTINUED | OUTPATIENT
Start: 2025-05-24 | End: 2025-05-28 | Stop reason: HOSPADM

## 2025-05-24 RX ORDER — MORPHINE SULFATE 4 MG/ML
4 INJECTION, SOLUTION INTRAMUSCULAR; INTRAVENOUS
Refills: 0 | Status: DISCONTINUED | OUTPATIENT
Start: 2025-05-24 | End: 2025-05-28 | Stop reason: HOSPADM

## 2025-05-24 RX ADMIN — MORPHINE SULFATE 2 MG: 2 INJECTION, SOLUTION INTRAMUSCULAR; INTRAVENOUS at 09:48

## 2025-05-24 RX ADMIN — MORPHINE SULFATE 4 MG: 4 INJECTION, SOLUTION INTRAMUSCULAR; INTRAVENOUS at 17:29

## 2025-05-24 RX ADMIN — SODIUM CHLORIDE, PRESERVATIVE FREE 1 ML: 5 INJECTION INTRAVENOUS at 20:03

## 2025-05-24 RX ADMIN — SODIUM CHLORIDE: 9 INJECTION, SOLUTION INTRAVENOUS at 20:03

## 2025-05-24 RX ADMIN — ENOXAPARIN SODIUM 40 MG: 100 INJECTION SUBCUTANEOUS at 12:21

## 2025-05-24 RX ADMIN — MORPHINE SULFATE 4 MG: 4 INJECTION, SOLUTION INTRAMUSCULAR; INTRAVENOUS at 12:29

## 2025-05-24 RX ADMIN — MORPHINE SULFATE 4 MG: 4 INJECTION, SOLUTION INTRAMUSCULAR; INTRAVENOUS at 21:51

## 2025-05-24 RX ADMIN — PANTOPRAZOLE SODIUM 40 MG: 40 INJECTION, POWDER, FOR SOLUTION INTRAVENOUS at 10:43

## 2025-05-24 RX ADMIN — MORPHINE SULFATE 4 MG: 4 INJECTION, SOLUTION INTRAMUSCULAR; INTRAVENOUS at 15:27

## 2025-05-24 RX ADMIN — LABETALOL HYDROCHLORIDE 20 MG: 5 INJECTION, SOLUTION INTRAVENOUS at 20:03

## 2025-05-24 RX ADMIN — ONDANSETRON 4 MG: 2 INJECTION, SOLUTION INTRAMUSCULAR; INTRAVENOUS at 08:37

## 2025-05-24 RX ADMIN — SODIUM CHLORIDE 1000 ML: 0.9 INJECTION, SOLUTION INTRAVENOUS at 07:53

## 2025-05-24 RX ADMIN — SODIUM CHLORIDE: 9 INJECTION, SOLUTION INTRAVENOUS at 12:25

## 2025-05-24 RX ADMIN — MORPHINE SULFATE 4 MG: 4 INJECTION, SOLUTION INTRAMUSCULAR; INTRAVENOUS at 19:27

## 2025-05-24 ASSESSMENT — PAIN - FUNCTIONAL ASSESSMENT
PAIN_FUNCTIONAL_ASSESSMENT: 0-10
PAIN_FUNCTIONAL_ASSESSMENT: ACTIVITIES ARE NOT PREVENTED
PAIN_FUNCTIONAL_ASSESSMENT: ACTIVITIES ARE NOT PREVENTED

## 2025-05-24 ASSESSMENT — LIFESTYLE VARIABLES
HOW OFTEN DO YOU HAVE A DRINK CONTAINING ALCOHOL: 2-4 TIMES A MONTH
HOW MANY STANDARD DRINKS CONTAINING ALCOHOL DO YOU HAVE ON A TYPICAL DAY: 1 OR 2

## 2025-05-24 ASSESSMENT — PAIN DESCRIPTION - LOCATION
LOCATION: ABDOMEN

## 2025-05-24 ASSESSMENT — PAIN SCALES - GENERAL
PAINLEVEL_OUTOF10: 7
PAINLEVEL_OUTOF10: 7
PAINLEVEL_OUTOF10: 6
PAINLEVEL_OUTOF10: 7
PAINLEVEL_OUTOF10: 8
PAINLEVEL_OUTOF10: 7

## 2025-05-24 ASSESSMENT — PAIN DESCRIPTION - DESCRIPTORS
DESCRIPTORS: CRAMPING

## 2025-05-24 ASSESSMENT — PAIN DESCRIPTION - ORIENTATION: ORIENTATION: LOWER

## 2025-05-24 NOTE — H&P
[facility] Medicine History & Physical    V 5.1    Date of Admission: 5/24/2025    Date of Service:  Pt seen/examined on 5/24/2025    [x]Admitted to Inpatient with expected LOS greater than two midnights due to medical therapy.  []Placed in Observation status.    Chief Admission Complaint: Abdominal pain with no bowel movement and vomiting    Presenting Admission History:      79 y.o. male who presented to [facility] with concerns of abdominal pain no bowel movement as well as persistent vomiting.  PMHx significant for type 2 diabetes, hyperlipidemia, hypertension, as well as a colectomy due to diverticulitis back in 2018.  He states that he has been experiencing mild abdominal pain alongside abdominal distention.  He states that he has not had a bowel movement in greater than 24 hours which is significantly abnormal for him due to the fact of his previous colectomy.  He has tried conservative management at home with minimal relief.  He has also noted that he is unable to keep down any of his medications or food for the last 24 hours.  He states that he is having persistent vomiting associated with abdominal pain.  He denies any fevers chills headaches chest pain or shortness of breath.  No additional concerns noted at this time.    Prior to admission, ED work up did reveal CT scan indication Stable mildly dilated small bowel loops in the anterior abdomen with fecalized small bowel contents. Distal small bowel is decompressed     Assessment/Plan:      Ileus  - General Surgery consulted  - Continue n.p.o. with IVF  - Oral blood medications held, continue when able  - IV PPI as well as IV Zofran/Compazine for symptomatic management    Hypertension  - Stable  - Home blood pressure medications held as patient is n.p.o.  - Labetalol as needed for blood pressure greater than 160    Type 2 diabetes  - Well-controlled without any medications  - N.p.o. low-dose sliding scale ordered    Hypothyroidism  -  Мария DO

## 2025-05-24 NOTE — PROGRESS NOTES
Patient from ED, report given via handoff. Assessment completed and documented. VSS. A/ox4. C/o 7/10 lower left abd pain, given prn pain medication per mar. Patient NPO. IV fluids running Through PIV. Bowel sounds absent. Denies passing gas. Wife at bedside.NO BP or sticks on left side due to previous hx of a surgeon taking an artery out of that arm.  Bed locked and in lowest position. Bedside table and call light within reach. Denies further needs at this time.

## 2025-05-24 NOTE — ED NOTES
Zachery Gómez is a 79 y.o. male admitted for  Principal Problem:    Ileus (HCC)  Resolved Problems:    * No resolved hospital problems. *  .   Patient Home via EMS transportation with No chief complaint on file.  .  Patient is alert and Person, Place, Time, and Situation  Patient's baseline mobility: Baseline Mobility: Independent   Code Status: Prior   Cardiac Rhythm:       Is patient on baseline Oxygen: no how many Liters:   Abnormal Assessment Findings: abd pain, nausea, vomiting    Isolation: None      NIH Score:    C-SSRS: Risk of Suicide: No Risk  Bedside swallow:        Active LDA's:   Peripheral IV 05/24/25 Right Antecubital (Active)   Site Assessment Clean, dry & intact 05/24/25 0709     Patient admitted with a ballard: no If the ballard is chronic was it exchanged:  Reason for ballard:   Patient admitted with Central Line:  . PICC line placement confirmed  Reason for Central line:   Was central line Inserted from an outside facility:        Family/Caregiver Present yes Any Concerns: no   Restraints no  Sitter no         Vitals: MEWS Score: 1    Vitals:    05/24/25 0713 05/24/25 0835 05/24/25 0950   BP: (!) 146/83 (!) 150/70 (!) 160/77   Pulse: 96 83 88   Resp: 18 20 20   Temp: 98.2 °F (36.8 °C)     TempSrc: Oral     SpO2: 96% 100% 95%   Weight: 93.9 kg (207 lb)     Height: 1.829 m (6')         Last documented pain score (0-10 scale) Pain Level: 6  Pain medication administered Yes- see MAR.    Pertinent or High Risk Medications/Drips: No.    Pending Blood Product Administration: no    Abnormal labs:   Abnormal Labs Reviewed   CBC WITH AUTO DIFFERENTIAL - Abnormal; Notable for the following components:       Result Value    Hemoglobin 12.8 (*)     Hematocrit 38.6 (*)     Lymphocytes Absolute 0.4 (*)     All other components within normal limits   COMPREHENSIVE METABOLIC PANEL W/ REFLEX TO MG FOR LOW K - Abnormal; Notable for the following components:    Glucose 190 (*)     BUN 45 (*)     Creatinine 1.9 (*)     Est,

## 2025-05-24 NOTE — PROGRESS NOTES
4 Eyes Skin Assessment     The patient is being assess for  Admission    I agree that 2 RN's have performed a thorough Head to Toe Skin Assessment on the patient. ALL assessment sites listed below have been assessed.       Areas assessed by both nurses: Rayray RN and RADHA Huston  [x]   Head, Face, and Ears   [x]   Shoulders, Back, and Chest  [x]   Arms, Elbows, and Hands   [x]   Coccyx, Sacrum, and Ischum  [x]   Legs, Feet, and Heels        Does the Patient have Skin Breakdown?  No         Bernardo Prevention initiated:  NA   Wound Care Orders initiated:  NA      C nurse consulted for Pressure Injury (Stage 3,4, Unstageable, DTI, NWPT, and Complex wounds):  NA      Nurse 1 eSignature: Electronically signed by Rayray Dawson RN on 5/24/25 at 1:28 PM EDT    **SHARE this note so that the co-signing nurse is able to place an eSignature**    Nurse 2 eSignature: Electronically signed by Betzaida Carrasco RN on 5/24/25 at 5:07 PM EDT

## 2025-05-24 NOTE — ED NOTES
Arrived via EMS due to having abdominal pain 8/10. Patient had surgery back in 2018 for removal of some of large intestine (hx of diverticulitis, arterial supply issue as well). En route patient received 4mg zofran and 100mcg fentanyl. Patient has concern for blockage. Patient's last BM was yesterday. He is concerned because he typically has 4-5 per day without a colon. Vitals stable en route.

## 2025-05-24 NOTE — ED PROVIDER NOTES
Emergency Department Attending Resident Note  Location: [facility] EMERGENCY DEPARTMENT  5/24/2025       Pt Name: Zachery Gómez  MRN: [id number]  Birthdate 1945    Date of evaluation: 5/24/2025  Provider: Terry Tillman MD  PCP: Aryan Mendez MD    Note Started: 7:21 AM EDT 5/24/25    CHIEF COMPLAINT  No chief complaint on file.      ROOM: 08     HISTORY OF PRESENT ILLNESS:  History obtained by patient. Limitations to history : None.     Zachery Gómez is a 79 y.o. male with a significant PMHx of diabetes, hyperlipidemia, hypertension presenting with abdominal pain.  Reports he has a history of having his colon removed in 2018.  Reports he has had this before and is having similar symptoms of blockage, fullness, distention.  He reports he stopped passing gas and having bowel movements.  His symptoms started about 6 AM yesterday after breakfast.  Reports he has had 6-8 bilious vomiting episodes.  Reports he had tenderness and pain in his left lower abdomen but the pain is not present at this time.  Endorses history of CKD and has not had much urine output in this time either.    Nursing Notes were all reviewed and agreed with or any disagreements were addressed in the HPI.      MEDICAL HISTORY  Past Medical History:   Diagnosis Date    Cancer (HCC)     Diabetes mellitus (HCC)     Hyperlipidemia     Hypertension     Thyroid disease         SURGICAL HISTORY  Past Surgical History:   Procedure Laterality Date    ABDOMEN SURGERY      TOTAL ABDOMINAL COLECTOMY,ILEO-RECTAL ANASTOMIOSIS, WITH TAKEDOWN OF COLOVESICAL FISTULA AND ABSCESS DRAINAGE WITH COLOPROCTOSTOMY, OMENTECOMY, INTRAOPERATIVE FLEXIBLE SIGMOIDOSCOPY 11/2018 dr. guerin    COLONOSCOPY      DILATATION, ESOPHAGUS         CURRENT MEDICATIONS  Previous Medications    ASPIRIN 81 MG EC TABLET    Take 1 tablet by mouth daily    BLOOD GLUCOSE TEST STRIPS (ACCU-CHEK MARLON PLUS) STRIP    Use to check blood sugar once daily  DX: E11.9    CHLORTHALIDONE

## 2025-05-25 LAB
ANION GAP SERPL CALCULATED.3IONS-SCNC: 12 MMOL/L (ref 3–16)
BASOPHILS # BLD: 0 K/UL (ref 0–0.2)
BASOPHILS NFR BLD: 0.4 %
BUN SERPL-MCNC: 40 MG/DL (ref 7–20)
CALCIUM SERPL-MCNC: 8.6 MG/DL (ref 8.3–10.6)
CHLORIDE SERPL-SCNC: 111 MMOL/L (ref 99–110)
CO2 SERPL-SCNC: 22 MMOL/L (ref 21–32)
CREAT SERPL-MCNC: 1.7 MG/DL (ref 0.8–1.3)
DEPRECATED RDW RBC AUTO: 15.2 % (ref 12.4–15.4)
EOSINOPHIL # BLD: 0 K/UL (ref 0–0.6)
EOSINOPHIL NFR BLD: 1.1 %
GFR SERPLBLD CREATININE-BSD FMLA CKD-EPI: 41 ML/MIN/{1.73_M2}
GLUCOSE BLD-MCNC: 100 MG/DL (ref 70–99)
GLUCOSE BLD-MCNC: 121 MG/DL (ref 70–99)
GLUCOSE BLD-MCNC: 122 MG/DL (ref 70–99)
GLUCOSE BLD-MCNC: 128 MG/DL (ref 70–99)
GLUCOSE SERPL-MCNC: 138 MG/DL (ref 70–99)
HCT VFR BLD AUTO: 36.6 % (ref 40.5–52.5)
HGB BLD-MCNC: 12.1 G/DL (ref 13.5–17.5)
LYMPHOCYTES # BLD: 0.3 K/UL (ref 1–5.1)
LYMPHOCYTES NFR BLD: 7 %
MCH RBC QN AUTO: 29.7 PG (ref 26–34)
MCHC RBC AUTO-ENTMCNC: 33 G/DL (ref 31–36)
MCV RBC AUTO: 89.9 FL (ref 80–100)
MONOCYTES # BLD: 0.5 K/UL (ref 0–1.3)
MONOCYTES NFR BLD: 11.1 %
NEUTROPHILS # BLD: 3.4 K/UL (ref 1.7–7.7)
NEUTROPHILS NFR BLD: 80.4 %
PERFORMED ON: ABNORMAL
PLATELET # BLD AUTO: 220 K/UL (ref 135–450)
PMV BLD AUTO: 7.2 FL (ref 5–10.5)
POTASSIUM SERPL-SCNC: 3.6 MMOL/L (ref 3.5–5.1)
RBC # BLD AUTO: 4.07 M/UL (ref 4.2–5.9)
SODIUM SERPL-SCNC: 145 MMOL/L (ref 136–145)
WBC # BLD AUTO: 4.3 K/UL (ref 4–11)

## 2025-05-25 PROCEDURE — 2500000003 HC RX 250 WO HCPCS

## 2025-05-25 PROCEDURE — 99222 1ST HOSP IP/OBS MODERATE 55: CPT | Performed by: SURGERY

## 2025-05-25 PROCEDURE — 2580000003 HC RX 258: Performed by: SURGERY

## 2025-05-25 PROCEDURE — 2580000003 HC RX 258

## 2025-05-25 PROCEDURE — 85025 COMPLETE CBC W/AUTO DIFF WBC: CPT

## 2025-05-25 PROCEDURE — 6360000002 HC RX W HCPCS

## 2025-05-25 PROCEDURE — 1200000000 HC SEMI PRIVATE

## 2025-05-25 PROCEDURE — 80048 BASIC METABOLIC PNL TOTAL CA: CPT

## 2025-05-25 PROCEDURE — 36415 COLL VENOUS BLD VENIPUNCTURE: CPT

## 2025-05-25 RX ORDER — SODIUM CHLORIDE 9 MG/ML
INJECTION, SOLUTION INTRAVENOUS CONTINUOUS
Status: DISCONTINUED | OUTPATIENT
Start: 2025-05-25 | End: 2025-05-28

## 2025-05-25 RX ADMIN — SODIUM CHLORIDE: 9 INJECTION, SOLUTION INTRAVENOUS at 05:17

## 2025-05-25 RX ADMIN — SODIUM CHLORIDE: 0.9 INJECTION, SOLUTION INTRAVENOUS at 17:45

## 2025-05-25 RX ADMIN — MORPHINE SULFATE 4 MG: 4 INJECTION, SOLUTION INTRAMUSCULAR; INTRAVENOUS at 00:01

## 2025-05-25 RX ADMIN — ENOXAPARIN SODIUM 40 MG: 100 INJECTION SUBCUTANEOUS at 08:50

## 2025-05-25 RX ADMIN — PANTOPRAZOLE SODIUM 40 MG: 40 INJECTION, POWDER, FOR SOLUTION INTRAVENOUS at 08:50

## 2025-05-25 RX ADMIN — SODIUM CHLORIDE, PRESERVATIVE FREE 10 ML: 5 INJECTION INTRAVENOUS at 20:51

## 2025-05-25 NOTE — PROGRESS NOTES
[facility] Medicine Progress Note  V 5.17      Date of Admission: 5/24/2025    Hospital Day: 2      Chief Admission Complaint: Abdominal pain with no bowel movement and vomiting    Subjective:    Patient reports he is feeling better but had pain that had him up around 3 AM.  His abdomen is  but is not painful.  He had some cramping and a small bowel movement around 525 this morning.  He has had no vomiting since being in the hospital and he feels his distention has improved.    Presenting Admission History:     79 y.o. male who presented to [facility] with concerns of abdominal pain no bowel movement as well as persistent vomiting.  PMHx significant for type 2 diabetes, hyperlipidemia, hypertension, as well as a colectomy due to diverticulitis back in 2018.  He states that he has been experiencing mild abdominal pain alongside abdominal distention.  He states that he has not had a bowel movement in greater than 24 hours which is significantly abnormal for him due to the fact of his previous colectomy.  He has tried conservative management at home with minimal relief.  He has also noted that he is unable to keep down any of his medications or food for the last 24 hours.  He states that he is having persistent vomiting associated with abdominal pain.  He denies any fevers chills headaches chest pain or shortness of breath.  No additional concerns noted at this time.     Prior to admission, ED work up did reveal CT scan indication Stable mildly dilated small bowel loops in the anterior abdomen with fecalized small bowel contents. Distal small bowel is decompressed      Assessment/Plan:       Ileus  - History of total colectomy in 2018  - General Surgery consulted  - Continue n.p.o. with IVF  - Oral blood pressure medications held, continue when able  - IV PPI as well as IV Zofran/Compazine for symptomatic management  - N.p.o., ambulate on 5/25 per general surgery, plan for liquid trial  by provider] valsartan  320 mg Oral Daily    sodium chloride flush  5-40 mL IntraVENous 2 times per day    enoxaparin  40 mg SubCUTAneous Daily    [Held by provider] spironolactone  25 mg Oral Nightly    pantoprazole  40 mg IntraVENous Daily    insulin lispro  0-4 Units SubCUTAneous 4 times per day     PRN Meds: sodium chloride flush, sodium chloride, ondansetron **OR** ondansetron, polyethylene glycol, acetaminophen **OR** acetaminophen, morphine **OR** morphine, labetalol, prochlorperazine, glucose, dextrose bolus **OR** dextrose bolus, glucagon (rDNA), dextrose      Physical Exam Performed:      General appearance:  No apparent distress  Respiratory:  Normal respiratory effort without tachypnea.   Cardiovascular:  Regular Rate w/ Regular rhythm.  Abdomen:  Soft, TTP in LUQ, non-distended. Bowel sounds diminished Musculoskeletal:  No edema  Neurologic:  Neurovascularly intact without focal sensory/motor deficits.  Psychiatric:  Alert and oriented    /64   Pulse 66   Temp 98.1 °F (36.7 °C) (Oral)   Resp 18   Ht 1.829 m (6')   Wt 93.9 kg (207 lb)   SpO2 94%   BMI 28.07 kg/m²     Telemetry:      Personally reviewed and interpreted telemetry (Rhythm Strip) on 5/25/2025.  Patient is currently NOT ON tele.    Diet: Diet NPO    DVT Prophylaxis: PPX dose LMWH    Code status: Full Code    PT/OT Eval Status: Not indicated    Multi-Disciplinary Rounds with Case Management completed on 5/25/2025 with the following recs:     Anticipated Discharge Location: Home     Anticipated Discharge Day/Date: TBD    Barriers to Discharge: Clinical Course    Likely rate limiting factor: Ileus    --------------------------------------------------    MDM (any 2 required for High level billing)    A. Problems (any 1)  [x] Acute/Chronic Illness/injury posing ongoing threat to life and/or bodily function without ongoing treatment    [] Severe exacerbation of chronic illness    --------------------------------------------------  BPastora

## 2025-05-25 NOTE — CONSULTS
[facility] SURGERY      Department of General Surgery Consult    PATIENT NAME: Zachery Gómez   YOB: 1945    ADMISSION DATE: 5/24/2025  7:05 AM      TODAY'S DATE: 5/25/2025    Reason for Consult:  SBO    Requesting Physician:  Hospitalist    HISTORY OF PRESENT ILLNESS:              The patient is a 79 y.o. male who presents with severe, sharp mid abdomen pain yesterday.  The pain has resolved.  He is passing gas.  He had colectomy in 2018 by Dr. Bardales at [facility].  He had associated N/V at home but none here at the hospital.    Past Medical History:        Diagnosis Date    Cancer (HCC)     Diabetes mellitus (HCC)     Hyperlipidemia     Hypertension     Thyroid disease        Past Surgical History:        Procedure Laterality Date    ABDOMEN SURGERY      TOTAL ABDOMINAL COLECTOMY,ILEO-RECTAL ANASTOMIOSIS, WITH TAKEDOWN OF COLOVESICAL FISTULA AND ABSCESS DRAINAGE WITH COLOPROCTOSTOMY, OMENTECOMY, INTRAOPERATIVE FLEXIBLE SIGMOIDOSCOPY 11/2018 dr. bardales    COLONOSCOPY      DILATATION, ESOPHAGUS         Current Medications:   Current Facility-Administered Medications: [Held by provider] aspirin EC tablet 81 mg, 81 mg, Oral, Daily  [Held by provider] chlorthalidone (HYGROTON) tablet 25 mg, 25 mg, Oral, Daily  [Held by provider] levothyroxine (SYNTHROID) tablet 125 mcg, 125 mcg, Oral, Daily  [Held by provider] valsartan (DIOVAN) tablet 320 mg, 320 mg, Oral, Daily  sodium chloride flush 0.9 % injection 5-40 mL, 5-40 mL, IntraVENous, 2 times per day  sodium chloride flush 0.9 % injection 5-40 mL, 5-40 mL, IntraVENous, PRN  0.9 % sodium chloride infusion, , IntraVENous, PRN  enoxaparin (LOVENOX) injection 40 mg, 40 mg, SubCUTAneous, Daily  ondansetron (ZOFRAN-ODT) disintegrating tablet 4 mg, 4 mg, Oral, Q8H PRN **OR** ondansetron (ZOFRAN) injection 4 mg, 4 mg, IntraVENous, Q6H PRN  polyethylene glycol (GLYCOLAX) packet 17 g, 17 g, Oral, Daily PRN  acetaminophen (TYLENOL) tablet 650 mg, 650 mg, Oral, Q6H

## 2025-05-25 NOTE — PROGRESS NOTES
Assessment completed and documented. VSS. A/ox4. States his pain is tolerable at this time, rating 5/10. Seems in much better spirits this morning. Wife at bedside. IV fluids running through PIV. Plan is to walk the halls today. Bowel sounds active. Patient states he is not passing gas, but did have a very small BM this morning. Bed locked and in lowest position. Bedside table and call light within reach. Denies further needs at this time.    Patient states he has been passing gas since lunch time.

## 2025-05-26 ENCOUNTER — APPOINTMENT (OUTPATIENT)
Dept: GENERAL RADIOLOGY | Age: 80
DRG: 390 | End: 2025-05-26
Payer: COMMERCIAL

## 2025-05-26 LAB
ANION GAP SERPL CALCULATED.3IONS-SCNC: 13 MMOL/L (ref 3–16)
BASOPHILS # BLD: 0 K/UL (ref 0–0.2)
BASOPHILS NFR BLD: 0.3 %
BUN SERPL-MCNC: 40 MG/DL (ref 7–20)
CALCIUM SERPL-MCNC: 8.9 MG/DL (ref 8.3–10.6)
CHLORIDE SERPL-SCNC: 109 MMOL/L (ref 99–110)
CO2 SERPL-SCNC: 21 MMOL/L (ref 21–32)
CREAT SERPL-MCNC: 1.7 MG/DL (ref 0.8–1.3)
DEPRECATED RDW RBC AUTO: 15.5 % (ref 12.4–15.4)
EOSINOPHIL # BLD: 0.1 K/UL (ref 0–0.6)
EOSINOPHIL NFR BLD: 1.6 %
GFR SERPLBLD CREATININE-BSD FMLA CKD-EPI: 41 ML/MIN/{1.73_M2}
GLUCOSE BLD-MCNC: 103 MG/DL (ref 70–99)
GLUCOSE BLD-MCNC: 120 MG/DL (ref 70–99)
GLUCOSE BLD-MCNC: 82 MG/DL (ref 70–99)
GLUCOSE BLD-MCNC: 94 MG/DL (ref 70–99)
GLUCOSE SERPL-MCNC: 130 MG/DL (ref 70–99)
HCT VFR BLD AUTO: 38.1 % (ref 40.5–52.5)
HGB BLD-MCNC: 12.3 G/DL (ref 13.5–17.5)
LYMPHOCYTES # BLD: 0.5 K/UL (ref 1–5.1)
LYMPHOCYTES NFR BLD: 13.4 %
MAGNESIUM SERPL-MCNC: 2.08 MG/DL (ref 1.8–2.4)
MCH RBC QN AUTO: 29.6 PG (ref 26–34)
MCHC RBC AUTO-ENTMCNC: 32.3 G/DL (ref 31–36)
MCV RBC AUTO: 91.4 FL (ref 80–100)
MONOCYTES # BLD: 0.7 K/UL (ref 0–1.3)
MONOCYTES NFR BLD: 17.3 %
NEUTROPHILS # BLD: 2.7 K/UL (ref 1.7–7.7)
NEUTROPHILS NFR BLD: 67.4 %
PERFORMED ON: ABNORMAL
PERFORMED ON: ABNORMAL
PERFORMED ON: NORMAL
PERFORMED ON: NORMAL
PLATELET # BLD AUTO: 212 K/UL (ref 135–450)
PMV BLD AUTO: 7.7 FL (ref 5–10.5)
POTASSIUM SERPL-SCNC: 3.3 MMOL/L (ref 3.5–5.1)
RBC # BLD AUTO: 4.17 M/UL (ref 4.2–5.9)
SODIUM SERPL-SCNC: 143 MMOL/L (ref 136–145)
WBC # BLD AUTO: 4 K/UL (ref 4–11)

## 2025-05-26 PROCEDURE — 85025 COMPLETE CBC W/AUTO DIFF WBC: CPT

## 2025-05-26 PROCEDURE — 2500000003 HC RX 250 WO HCPCS

## 2025-05-26 PROCEDURE — 83735 ASSAY OF MAGNESIUM: CPT

## 2025-05-26 PROCEDURE — 6360000002 HC RX W HCPCS

## 2025-05-26 PROCEDURE — 99232 SBSQ HOSP IP/OBS MODERATE 35: CPT | Performed by: SURGERY

## 2025-05-26 PROCEDURE — 80048 BASIC METABOLIC PNL TOTAL CA: CPT

## 2025-05-26 PROCEDURE — 36415 COLL VENOUS BLD VENIPUNCTURE: CPT

## 2025-05-26 PROCEDURE — 2580000003 HC RX 258: Performed by: SURGERY

## 2025-05-26 PROCEDURE — 1200000000 HC SEMI PRIVATE

## 2025-05-26 PROCEDURE — 74019 RADEX ABDOMEN 2 VIEWS: CPT

## 2025-05-26 RX ORDER — POTASSIUM CHLORIDE 7.45 MG/ML
10 INJECTION INTRAVENOUS
Status: COMPLETED | OUTPATIENT
Start: 2025-05-26 | End: 2025-05-26

## 2025-05-26 RX ORDER — POTASSIUM CHLORIDE 1500 MG/1
40 TABLET, EXTENDED RELEASE ORAL ONCE
Status: DISCONTINUED | OUTPATIENT
Start: 2025-05-26 | End: 2025-05-26

## 2025-05-26 RX ADMIN — SODIUM CHLORIDE: 0.9 INJECTION, SOLUTION INTRAVENOUS at 08:09

## 2025-05-26 RX ADMIN — ENOXAPARIN SODIUM 40 MG: 100 INJECTION SUBCUTANEOUS at 08:16

## 2025-05-26 RX ADMIN — SODIUM CHLORIDE, PRESERVATIVE FREE 10 ML: 5 INJECTION INTRAVENOUS at 21:35

## 2025-05-26 RX ADMIN — PANTOPRAZOLE SODIUM 40 MG: 40 INJECTION, POWDER, FOR SOLUTION INTRAVENOUS at 08:16

## 2025-05-26 RX ADMIN — POTASSIUM CHLORIDE 10 MEQ: 7.46 INJECTION, SOLUTION INTRAVENOUS at 09:37

## 2025-05-26 RX ADMIN — SODIUM CHLORIDE: 0.9 INJECTION, SOLUTION INTRAVENOUS at 21:36

## 2025-05-26 RX ADMIN — POTASSIUM CHLORIDE 10 MEQ: 7.46 INJECTION, SOLUTION INTRAVENOUS at 12:35

## 2025-05-26 RX ADMIN — POTASSIUM CHLORIDE 10 MEQ: 7.46 INJECTION, SOLUTION INTRAVENOUS at 08:13

## 2025-05-26 RX ADMIN — POTASSIUM CHLORIDE 10 MEQ: 7.46 INJECTION, SOLUTION INTRAVENOUS at 10:43

## 2025-05-26 NOTE — PROGRESS NOTES
Pt assessment completed and charted. VSS. Pt a/ox4. Pt denies pain. Pt NPO. Pt walked the halls this afternoon. Pt reports passing gas. Pt denies any other needs at this time.  Pt calls out appropriately.       Pt is a fall risk;  -Bed in lowest position and wheels locked.  -Call light within reach.   -Bedside table within reach.   -Non-skid footwear in place.

## 2025-05-26 NOTE — PROGRESS NOTES
[facility] Medicine Progress Note  V 5.17      Date of Admission: 5/24/2025    Hospital Day: 3      Chief Admission Complaint: Abdominal pain with no bowel movement and vomiting    Subjective:    Patient was seen sitting on the side of the bed.  He reports he is feeling much better this a.m. and that he had 2 large bowel movements overnight.  He is also been passing some gas.  He reports he is walking around without any issues at this time.    Presenting Admission History:     79 y.o. male who presented to [facility] with concerns of abdominal pain no bowel movement as well as persistent vomiting.  PMHx significant for type 2 diabetes, hyperlipidemia, hypertension, as well as a colectomy due to diverticulitis back in 2018.  He states that he has been experiencing mild abdominal pain alongside abdominal distention.  He states that he has not had a bowel movement in greater than 24 hours which is significantly abnormal for him due to the fact of his previous colectomy.  He has tried conservative management at home with minimal relief.  He has also noted that he is unable to keep down any of his medications or food for the last 24 hours.  He states that he is having persistent vomiting associated with abdominal pain.  He denies any fevers chills headaches chest pain or shortness of breath.  No additional concerns noted at this time.     Prior to admission, ED work up did reveal CT scan indication Stable mildly dilated small bowel loops in the anterior abdomen with fecalized small bowel contents. Distal small bowel is decompressed      Assessment/Plan:       Ileus  - History of total colectomy in 2018  - General Surgery consulted  - Continue n.p.o. with IVF  - Oral blood pressure medications held, continue when able  - IV PPI as well as IV Zofran/Compazine for symptomatic management  - N.p.o., with sips of clears today but radiograph still suggesting SBO per GEN surge  - Continue IV hydration  ondansetron, polyethylene glycol, acetaminophen **OR** acetaminophen, morphine **OR** morphine, labetalol, prochlorperazine, glucose, dextrose bolus **OR** dextrose bolus, glucagon (rDNA), dextrose      Physical Exam Performed:      General appearance:  No apparent distress  Respiratory:  Normal respiratory effort without tachypnea.   Cardiovascular:  Regular Rate w/ Regular rhythm.  Abdomen:  Soft, TTP diffusely, non-distended. Bowel sounds present but slightly diminished   Musculoskeletal:  No edema  Neurologic:  Neurovascularly intact without focal sensory/motor deficits.  Psychiatric:  Alert and oriented    BP (!) 150/69   Pulse 65   Temp 97.5 °F (36.4 °C) (Oral)   Resp 18   Ht 1.829 m (6')   Wt 93.9 kg (207 lb)   SpO2 96%   BMI 28.07 kg/m²     Telemetry:      Personally reviewed and interpreted telemetry (Rhythm Strip) on 5/26/2025.  Patient is currently NOT ON tele.    Diet: Diet NPO    DVT Prophylaxis: PPX dose LMWH    Code status: Full Code    PT/OT Eval Status: Not indicated    Multi-Disciplinary Rounds with Case Management completed on 5/26/2025 with the following recs:     Anticipated Discharge Location: Home     Anticipated Discharge Day/Date: TBD    Barriers to Discharge: Clinical Course    Likely rate limiting factor: Ileus    --------------------------------------------------    MDM (any 2 required for High level billing)    A. Problems (any 1)  [x] Acute/Chronic Illness/injury posing ongoing threat to life and/or bodily function without ongoing treatment    [] Severe exacerbation of chronic illness    --------------------------------------------------  B. Risk of Treatment (any 1)    [x] Drugs/treatments that require intensive monitoring for toxicity    [] IV ABX (Vancomycin, Aminoglycosides, etc)     [] Post-Cath/Contrast study requiring serial monitoring    [x] IV Narcotic analgesia    [] Aggressive IV diuresis    [] Hypertonic Saline    [] Critical electrolyte abnormalities requiring IV

## 2025-05-26 NOTE — PROGRESS NOTES
[facility] GENERAL SURGERY DAILY PROGRESS NOTE    SUBJECTIVE: Awake, alert.  Feels much better.  Had large BM he reports.  Passing some gas.     OBJECTIVE: CURRENT VITALS:  BP (!) 148/67   Pulse 60   Temp 97.5 °F (36.4 °C) (Oral)   Resp 16   Ht 1.829 m (6')   Wt 93.9 kg (207 lb)   SpO2 96%   BMI 28.07 kg/m²          ABD: Soft.  Minimal tenderness.  Non distended.     LABS:    CBC:   Recent Labs     05/24/25 0755 05/25/25  0459 05/26/25  0519   WBC 7.9 4.3 4.0   RBC 4.32 4.07* 4.17*   HGB 12.8* 12.1* 12.3*   HCT 38.6* 36.6* 38.1*   MCV 89.3 89.9 91.4   RDW 14.9 15.2 15.5*    220 212     BMP:   Recent Labs     05/24/25 0755 05/25/25 0459 05/26/25  0519    145 143   K 3.9 3.6 3.3*    111* 109   CO2 23 22 21   BUN 45* 40* 40*   CREATININE 1.9* 1.7* 1.7*     Recent Labs     05/26/25  0519   MG 2.08       XRAYS: AXR still with dilated SB and A/F levels.     ASSESSMENT:  SBO  Personal history total colectomy 2018     PLAN:  Ambulate   NPO with sips clears today as he is clinically much improved but radiographs are still suggesting SBO.        STUART CARDOSO MD

## 2025-05-26 NOTE — PLAN OF CARE
Problem: Safety - Adult  Goal: Free from fall injury  5/26/2025 1250 by Dana Zimmer RN  Outcome: Progressing  Flowsheets (Taken 5/26/2025 1250)  Free From Fall Injury: Instruct family/caregiver on patient safety     Problem: Chronic Conditions and Co-morbidities  Goal: Patient's chronic conditions and co-morbidity symptoms are monitored and maintained or improved  5/26/2025 1250 by Dana Zimmer RN  Outcome: Progressing  Flowsheets (Taken 5/26/2025 1250)  Care Plan - Patient's Chronic Conditions and Co-Morbidity Symptoms are Monitored and Maintained or Improved: Monitor and assess patient's chronic conditions and comorbid symptoms for stability, deterioration, or improvement     Problem: Pain  Goal: Verbalizes/displays adequate comfort level or baseline comfort level  5/26/2025 1250 by Dana Zimmer RN  Outcome: Progressing  Flowsheets (Taken 5/26/2025 1250)  Verbalizes/displays adequate comfort level or baseline comfort level:   Encourage patient to monitor pain and request assistance   Assess pain using appropriate pain scale   Administer analgesics based on type and severity of pain and evaluate response   Implement non-pharmacological measures as appropriate and evaluate response

## 2025-05-26 NOTE — PLAN OF CARE
Denies pain. Ambulating independently in room with steady gait.   Problem: Safety - Adult  Goal: Free from fall injury  Outcome: Progressing     Problem: Pain  Goal: Verbalizes/displays adequate comfort level or baseline comfort level  Outcome: Progressing

## 2025-05-27 ENCOUNTER — APPOINTMENT (OUTPATIENT)
Dept: GENERAL RADIOLOGY | Age: 80
DRG: 390 | End: 2025-05-27
Payer: COMMERCIAL

## 2025-05-27 PROBLEM — R10.84 GENERALIZED ABDOMINAL PAIN: Status: ACTIVE | Noted: 2025-05-27

## 2025-05-27 LAB
ANION GAP SERPL CALCULATED.3IONS-SCNC: 16 MMOL/L (ref 3–16)
BASOPHILS # BLD: 0 K/UL (ref 0–0.2)
BASOPHILS NFR BLD: 0.4 %
BUN SERPL-MCNC: 37 MG/DL (ref 7–20)
CALCIUM SERPL-MCNC: 8.3 MG/DL (ref 8.3–10.6)
CHLORIDE SERPL-SCNC: 112 MMOL/L (ref 99–110)
CO2 SERPL-SCNC: 17 MMOL/L (ref 21–32)
CREAT SERPL-MCNC: 1.6 MG/DL (ref 0.8–1.3)
DEPRECATED RDW RBC AUTO: 15 % (ref 12.4–15.4)
EOSINOPHIL # BLD: 0.1 K/UL (ref 0–0.6)
EOSINOPHIL NFR BLD: 2.4 %
GFR SERPLBLD CREATININE-BSD FMLA CKD-EPI: 44 ML/MIN/{1.73_M2}
GLUCOSE BLD-MCNC: 102 MG/DL (ref 70–99)
GLUCOSE BLD-MCNC: 86 MG/DL (ref 70–99)
GLUCOSE BLD-MCNC: 90 MG/DL (ref 70–99)
GLUCOSE BLD-MCNC: 93 MG/DL (ref 70–99)
GLUCOSE BLD-MCNC: 98 MG/DL (ref 70–99)
GLUCOSE SERPL-MCNC: 97 MG/DL (ref 70–99)
HCT VFR BLD AUTO: 34.1 % (ref 40.5–52.5)
HGB BLD-MCNC: 11.3 G/DL (ref 13.5–17.5)
LYMPHOCYTES # BLD: 0.6 K/UL (ref 1–5.1)
LYMPHOCYTES NFR BLD: 13.8 %
MAGNESIUM SERPL-MCNC: 2.04 MG/DL (ref 1.8–2.4)
MCH RBC QN AUTO: 29.9 PG (ref 26–34)
MCHC RBC AUTO-ENTMCNC: 33.3 G/DL (ref 31–36)
MCV RBC AUTO: 89.9 FL (ref 80–100)
MONOCYTES # BLD: 0.4 K/UL (ref 0–1.3)
MONOCYTES NFR BLD: 9.6 %
NEUTROPHILS # BLD: 3.1 K/UL (ref 1.7–7.7)
NEUTROPHILS NFR BLD: 73.8 %
PERFORMED ON: ABNORMAL
PERFORMED ON: NORMAL
PHOSPHATE SERPL-MCNC: 1.9 MG/DL (ref 2.5–4.9)
PHOSPHATE SERPL-MCNC: 2.7 MG/DL (ref 2.5–4.9)
PLATELET # BLD AUTO: 199 K/UL (ref 135–450)
PMV BLD AUTO: 7.7 FL (ref 5–10.5)
POTASSIUM SERPL-SCNC: 3.3 MMOL/L (ref 3.5–5.1)
RBC # BLD AUTO: 3.79 M/UL (ref 4.2–5.9)
SODIUM SERPL-SCNC: 145 MMOL/L (ref 136–145)
WBC # BLD AUTO: 4.2 K/UL (ref 4–11)

## 2025-05-27 PROCEDURE — 84100 ASSAY OF PHOSPHORUS: CPT

## 2025-05-27 PROCEDURE — 6360000002 HC RX W HCPCS

## 2025-05-27 PROCEDURE — 2500000003 HC RX 250 WO HCPCS

## 2025-05-27 PROCEDURE — 2580000003 HC RX 258

## 2025-05-27 PROCEDURE — 1200000000 HC SEMI PRIVATE

## 2025-05-27 PROCEDURE — 36415 COLL VENOUS BLD VENIPUNCTURE: CPT

## 2025-05-27 PROCEDURE — 85025 COMPLETE CBC W/AUTO DIFF WBC: CPT

## 2025-05-27 PROCEDURE — 6360000002 HC RX W HCPCS: Performed by: STUDENT IN AN ORGANIZED HEALTH CARE EDUCATION/TRAINING PROGRAM

## 2025-05-27 PROCEDURE — 99232 SBSQ HOSP IP/OBS MODERATE 35: CPT | Performed by: SURGERY

## 2025-05-27 PROCEDURE — 74019 RADEX ABDOMEN 2 VIEWS: CPT

## 2025-05-27 PROCEDURE — 6370000000 HC RX 637 (ALT 250 FOR IP)

## 2025-05-27 PROCEDURE — 2580000003 HC RX 258: Performed by: SURGERY

## 2025-05-27 PROCEDURE — 80048 BASIC METABOLIC PNL TOTAL CA: CPT

## 2025-05-27 PROCEDURE — APPNB45 APP NON BILLABLE 31-45 MINUTES: Performed by: CLINICAL NURSE SPECIALIST

## 2025-05-27 PROCEDURE — 83735 ASSAY OF MAGNESIUM: CPT

## 2025-05-27 RX ORDER — POTASSIUM CHLORIDE 1500 MG/1
40 TABLET, EXTENDED RELEASE ORAL 2 TIMES DAILY
Status: COMPLETED | OUTPATIENT
Start: 2025-05-27 | End: 2025-05-27

## 2025-05-27 RX ORDER — LABETALOL HYDROCHLORIDE 5 MG/ML
20 INJECTION, SOLUTION INTRAVENOUS EVERY 4 HOURS PRN
Status: DISCONTINUED | OUTPATIENT
Start: 2025-05-27 | End: 2025-05-28 | Stop reason: HOSPADM

## 2025-05-27 RX ADMIN — LABETALOL HYDROCHLORIDE 20 MG: 5 INJECTION, SOLUTION INTRAVENOUS at 15:00

## 2025-05-27 RX ADMIN — ASPIRIN 81 MG: 81 TABLET, COATED ORAL at 09:14

## 2025-05-27 RX ADMIN — LEVOTHYROXINE SODIUM 125 MCG: 0.12 TABLET ORAL at 09:14

## 2025-05-27 RX ADMIN — LABETALOL HYDROCHLORIDE 20 MG: 5 INJECTION, SOLUTION INTRAVENOUS at 00:32

## 2025-05-27 RX ADMIN — POTASSIUM CHLORIDE 40 MEQ: 1500 TABLET, EXTENDED RELEASE ORAL at 09:14

## 2025-05-27 RX ADMIN — POTASSIUM CHLORIDE 40 MEQ: 1500 TABLET, EXTENDED RELEASE ORAL at 19:53

## 2025-05-27 RX ADMIN — SPIRONOLACTONE 25 MG: 25 TABLET ORAL at 19:52

## 2025-05-27 RX ADMIN — SODIUM CHLORIDE: 0.9 INJECTION, SOLUTION INTRAVENOUS at 11:28

## 2025-05-27 RX ADMIN — LABETALOL HYDROCHLORIDE 20 MG: 5 INJECTION, SOLUTION INTRAVENOUS at 06:19

## 2025-05-27 RX ADMIN — SODIUM CHLORIDE, PRESERVATIVE FREE 10 ML: 5 INJECTION INTRAVENOUS at 19:57

## 2025-05-27 RX ADMIN — SODIUM CHLORIDE, PRESERVATIVE FREE 10 ML: 5 INJECTION INTRAVENOUS at 09:15

## 2025-05-27 RX ADMIN — PANTOPRAZOLE SODIUM 40 MG: 40 INJECTION, POWDER, FOR SOLUTION INTRAVENOUS at 09:14

## 2025-05-27 RX ADMIN — SODIUM PHOSPHATE, MONOBASIC, MONOHYDRATE AND SODIUM PHOSPHATE, DIBASIC, ANHYDROUS 15 MMOL: 142; 276 INJECTION, SOLUTION INTRAVENOUS at 09:52

## 2025-05-27 RX ADMIN — ENOXAPARIN SODIUM 40 MG: 100 INJECTION SUBCUTANEOUS at 09:14

## 2025-05-27 NOTE — PLAN OF CARE
Denies pain/nausea. Ambulating in halls & room with steady gait. BP elevated, labetolol parameters changed.  Problem: Safety - Adult  Goal: Free from fall injury  Outcome: Progressing     Problem: Chronic Conditions and Co-morbidities  Goal: Patient's chronic conditions and co-morbidity symptoms are monitored and maintained or improved  Outcome: Progressing     Problem: Pain  Goal: Verbalizes/displays adequate comfort level or baseline comfort level  Outcome: Progressing

## 2025-05-27 NOTE — PROGRESS NOTES
[facility]    PATIENT NAME: Zachery Gómez     TODAY'S DATE: 5/27/2025    CHIEF COMPLAINT: none    INTERVAL HISTORY/HPI:    Pt doing well, denies abd pain or bloating. Passing flatus. .     OBJECTIVE:  VITALS:  BP (!) 153/76   Pulse 60   Temp 97.7 °F (36.5 °C) (Oral)   Resp 16   Ht 1.829 m (6')   Wt 93.9 kg (207 lb)   SpO2 99%   BMI 28.07 kg/m²     INTAKE/OUTPUT:    No intake/output data recorded.  No intake/output data recorded.    CONSTITUTIONAL:  awake and alert  LUNGS:  no crackles or wheezing  ABDOMEN:   soft, non-distended, non-tender     Data:  CBC:   Recent Labs     05/25/25 0459 05/26/25 0519 05/27/25 0612   WBC 4.3 4.0 4.2   HGB 12.1* 12.3* 11.3*   HCT 36.6* 38.1* 34.1*    212 199     BMP:    Recent Labs     05/25/25 0459 05/26/25 0519 05/27/25 0612    143 145   K 3.6 3.3* 3.3*   * 109 112*   CO2 22 21 17*   BUN 40* 40* 37*   CREATININE 1.7* 1.7* 1.6*   GLUCOSE 138* 130* 97     Hepatic: No results for input(s): \"AST\", \"ALT\", \"BILITOT\", \"ALKPHOS\" in the last 72 hours.    Invalid input(s): \"ALB\"  Mag:      Recent Labs     05/26/25 0519 05/27/25 0612   MG 2.08 2.04      Phos:     Recent Labs     05/27/25 0612   PHOS 1.9*      INR: No results for input(s): \"INR\" in the last 72 hours.    Radiology Review:    Abdomen    HISTORY : Small bowel obstruction  VIEWS : 5/26/2025   Impression:       Diffusely dilated loops of small bowel are again seen without significant  change.    Electronically signed by Ford Smith         ASSESSMENT AND PLAN:  SBO with history of subtotal colectomy in 2018    AXR as above - likely his small bowel would show some dilation after his surgery.     Liquid diet today    Electronically signed by GILLIAN Roy - MIRYAM       SURGERY STAFF      I have personally performed a face to face diagnostic evaluation on this patient and agree with the progress note and care plan of Tatianna Marie NP.   More than half of the time spent on

## 2025-05-27 NOTE — CARE COORDINATION
Case Management Assessment  Initial Evaluation    Date/Time of Evaluation: 5/27/2025 10:56 AM  Assessment Completed by: Taurus Aviles RN    If patient is discharged prior to next notation, then this note serves as note for discharge by case management.    Patient Name: Zachery Gómez                   YOB: 1945  Diagnosis: Ileus (HCC) [K56.7]  Generalized abdominal pain [R10.84]  Bilious vomiting with nausea [R11.14]                   Date / Time: 5/24/2025  7:05 AM    Patient Admission Status: Inpatient   Readmission Risk (Low < 19, Mod (19-27), High > 27): Readmission Risk Score: 13.7    Current PCP: Elvis Shah MD  PCP verified by ? Yes    Chart Reviewed: Yes      History Provided by: Patient  Patient Orientation: Alert and Oriented, Person, Place, Situation, Self    Patient Cognition: Alert    Hospitalization in the last 30 days (Readmission):  No    If yes, Readmission Assessment in  Navigator will be completed.    Advance Directives:      Code Status: Full Code   Patient's Primary Decision Maker is: Legal Next of Kin    Primary Decision Maker: romy gómez - Spouse - [phone]    Discharge Planning:    Patient lives with: Spouse/Significant Other Type of Home: House  Primary Care Giver: Self  Patient Support Systems include: Spouse/Significant Other   Current Financial resources: Medicare  Current community resources: None  Current services prior to admission: None            Current DME:              Type of Home Care services:  None    ADLS  Prior functional level: Independent in ADLs/IADLs  Current functional level: Independent in ADLs/IADLs    PT AM-PAC:   /24  OT AM-PAC:   /24    Family can provide assistance at DC: Yes  Would you like Case Management to discuss the discharge plan with any other family members/significant others, and if so, who? No  Plans to Return to Present Housing: Yes  Other Identified Issues/Barriers to RETURNING to current housing: none  Potential Assistance  complains of pain/discomfort

## 2025-05-27 NOTE — PROGRESS NOTES
[facility] Medicine Progress Note  V 5.17      Date of Admission: 5/24/2025    Hospital Day: 4      Chief Admission Complaint: Abdominal pain with no bowel movement and vomiting    Subjective:    Patient was evaluated lying in bed.  He reports that he is walking a lot.  He reports that he is continuing to have bowel movements and is passing gas.  He reports that his nephrologist recommended that if he is ever admitted for 2 days or more that he should be looked at by nephrologist given his chronic kidney failure.    Presenting Admission History:     79 y.o. male who presented to [facility] with concerns of abdominal pain no bowel movement as well as persistent vomiting.  PMHx significant for type 2 diabetes, hyperlipidemia, hypertension, as well as a colectomy due to diverticulitis back in 2018.  He states that he has been experiencing mild abdominal pain alongside abdominal distention.  He states that he has not had a bowel movement in greater than 24 hours which is significantly abnormal for him due to the fact of his previous colectomy.  He has tried conservative management at home with minimal relief.  He has also noted that he is unable to keep down any of his medications or food for the last 24 hours.  He states that he is having persistent vomiting associated with abdominal pain.  He denies any fevers chills headaches chest pain or shortness of breath.  No additional concerns noted at this time.     Prior to admission, ED work up did reveal CT scan indication Stable mildly dilated small bowel loops in the anterior abdomen with fecalized small bowel contents. Distal small bowel is decompressed      Assessment/Plan:       Ileus  - History of total colectomy in 2018  - General Surgery consulted  - Continue n.p.o. with IVF  - Oral blood pressure medications held, continue when able  - IV PPI as well as IV Zofran/Compazine for symptomatic management  - N.p.o., with sips of clears today but

## 2025-05-27 NOTE — CONSULTS
Inpatient Note    CC: abdominal pain/N/V  Summary:   Zachery Gómez is being seen by nephrology for CKD-IIIb. He is a 79 y.o. male with a PMH significant for hypertenison, pre-diabetes, hypothyroidism, hyperlipidemia who presented to the hospital 5/24/2025 with N/V and abdominal pain. He was noted to have an ileus. He is being managed non-operatively. He requested a nephrology consult due to CKD.    Interval History  Seen at bedside  /76, slightly elevated but acceptable in the acute setting  K 3.3 today, phos 1.9  Cr stable at 1.6    Plan:   - chlorthalidone currently on hold due to hypokalemia. Once K back at goal this can be resumed  - enteric KCL 40 mEq BID today.  - Also getting IV KCL 40 mEq today  - K-phos replacement for hypophosphatemia  - can resume valsartan by tomorrow if BP remains consistently elevated.  - goal K 4.0-4.5 to avoid worsening of ileus.    Assessment:   CKD-IIIb:  - baseline Cr 1.6-1.9  - follows with Dr. 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  - on farxiga and valsartan for end-organ protection  - renal function stable.    Hypokalemia/Hypophosphatemia  - likely due to GI losses  - replete.    Hypertension:  - home regimen: valsartan 320 mg daily, spironolactone 25 mg daily, nifedipine 30 mg BID, chlorthalidone 25 mg daily.    Ileus:  - per primary/general surgery.      Deondre Katz MD  [facility] Nephrology  Office: [phone]          PE:   Vitals:    05/27/25 0912   BP: (!) 153/76   Pulse: 60   Resp: 16   Temp: 97.7 °F (36.5 °C)   SpO2: 99%       General appearance: in NAD  Respiratory: Respiratory effort appears normal, bilateral equal chest rise, no wheeze, no crackles  Cardiovascular: Ausculation shows RRR no edema  Abdomen: No visible mass or tenderness, non distended.

## 2025-05-27 NOTE — PLAN OF CARE
Problem: Safety - Adult  Goal: Free from fall injury  5/27/2025 1420 by Unique Cherry, RN  Outcome: Progressing  Flowsheets (Taken 5/27/2025 1420)  Free From Fall Injury: Instruct family/caregiver on patient safety     Problem: Pain  Goal: Verbalizes/displays adequate comfort level or baseline comfort level  5/27/2025 1420 by Unique Cherry, RN  Outcome: Progressing  Flowsheets (Taken 5/27/2025 1420)  Verbalizes/displays adequate comfort level or baseline comfort level:   Encourage patient to monitor pain and request assistance   Assess pain using appropriate pain scale   Administer analgesics based on type and severity of pain and evaluate response   Implement non-pharmacological measures as appropriate and evaluate response   Consider cultural and social influences on pain and pain management   Notify Licensed Independent Practitioner if interventions unsuccessful or patient reports new pain

## 2025-05-27 NOTE — CONSULTS
Consult Placed   Consult sent via [employer]  Who: Areli Sanchez  Date: 05/27/25  Time: 10:00 AM     Electronically signed by Heidi Coleman on 5/27/2025 at 10:00 AM

## 2025-05-27 NOTE — CONSULTS
Consult Call Back    Who:Deondre Kazt   Date:5/27/2025,  Time:10:37 AM    Electronically signed by Heidi Coleman on 5/27/25 at 10:37 AM EDT

## 2025-05-27 NOTE — PROGRESS NOTES
Secure message to Jessica Fernando MD:   Pt here for SBO, hx of kidney disease, sees nephrology outpatient. concerned about his BP of 154/71. States at home he calls nephrologist for SBP >135. Currently has labetolol ordered for SBP >160. Could you adjust the parameters for the med to keep his BP lower?    Awaiting response.

## 2025-05-27 NOTE — PROGRESS NOTES
Pt resting in bed comfortably. Pt walked 6 laps, denies pain, A&O x 4. VSS. Tolerating full liquids. IV fluids through PIV. BM this AM. Bowel Sounds active. Bed in lowest postion. Bedside table and call light within reach. Denies further needs at this time.

## 2025-05-28 VITALS
SYSTOLIC BLOOD PRESSURE: 150 MMHG | HEART RATE: 63 BPM | DIASTOLIC BLOOD PRESSURE: 67 MMHG | HEIGHT: 72 IN | RESPIRATION RATE: 18 BRPM | TEMPERATURE: 97.7 F | WEIGHT: 207 LBS | BODY MASS INDEX: 28.04 KG/M2 | OXYGEN SATURATION: 98 %

## 2025-05-28 LAB
ANION GAP SERPL CALCULATED.3IONS-SCNC: 14 MMOL/L (ref 3–16)
BUN SERPL-MCNC: 29 MG/DL (ref 7–20)
CALCIUM SERPL-MCNC: 8.3 MG/DL (ref 8.3–10.6)
CHLORIDE SERPL-SCNC: 110 MMOL/L (ref 99–110)
CO2 SERPL-SCNC: 17 MMOL/L (ref 21–32)
CREAT SERPL-MCNC: 1.3 MG/DL (ref 0.8–1.3)
GFR SERPLBLD CREATININE-BSD FMLA CKD-EPI: 56 ML/MIN/{1.73_M2}
GLUCOSE BLD-MCNC: 85 MG/DL (ref 70–99)
GLUCOSE BLD-MCNC: 87 MG/DL (ref 70–99)
GLUCOSE SERPL-MCNC: 95 MG/DL (ref 70–99)
PERFORMED ON: NORMAL
PERFORMED ON: NORMAL
POTASSIUM SERPL-SCNC: 3.8 MMOL/L (ref 3.5–5.1)
SODIUM SERPL-SCNC: 141 MMOL/L (ref 136–145)

## 2025-05-28 PROCEDURE — 2580000003 HC RX 258: Performed by: SURGERY

## 2025-05-28 PROCEDURE — 80048 BASIC METABOLIC PNL TOTAL CA: CPT

## 2025-05-28 PROCEDURE — 99232 SBSQ HOSP IP/OBS MODERATE 35: CPT | Performed by: SURGERY

## 2025-05-28 PROCEDURE — 6370000000 HC RX 637 (ALT 250 FOR IP)

## 2025-05-28 PROCEDURE — 36415 COLL VENOUS BLD VENIPUNCTURE: CPT

## 2025-05-28 PROCEDURE — 6360000002 HC RX W HCPCS

## 2025-05-28 RX ORDER — PANTOPRAZOLE SODIUM 40 MG/1
40 TABLET, DELAYED RELEASE ORAL
Qty: 90 TABLET | Refills: 1 | Status: SHIPPED | OUTPATIENT
Start: 2025-05-28

## 2025-05-28 RX ADMIN — PANTOPRAZOLE SODIUM 40 MG: 40 INJECTION, POWDER, FOR SOLUTION INTRAVENOUS at 08:02

## 2025-05-28 RX ADMIN — ASPIRIN 81 MG: 81 TABLET, COATED ORAL at 08:01

## 2025-05-28 RX ADMIN — LEVOTHYROXINE SODIUM 125 MCG: 0.12 TABLET ORAL at 08:02

## 2025-05-28 RX ADMIN — SODIUM CHLORIDE: 0.9 INJECTION, SOLUTION INTRAVENOUS at 00:59

## 2025-05-28 RX ADMIN — ENOXAPARIN SODIUM 40 MG: 100 INJECTION SUBCUTANEOUS at 08:02

## 2025-05-28 NOTE — PLAN OF CARE
Problem: Safety - Adult  Goal: Free from fall injury  5/27/2025 2110 by Manjula Bagley RN  Outcome: Progressing  Flowsheets (Taken 5/27/2025 2110)  Free From Fall Injury: Instruct family/caregiver on patient safety     Problem: Chronic Conditions and Co-morbidities  Goal: Patient's chronic conditions and co-morbidity symptoms are monitored and maintained or improved  5/27/2025 2110 by Manjula Bagley RN  Outcome: Progressing  Flowsheets (Taken 5/27/2025 2110)  Care Plan - Patient's Chronic Conditions and Co-Morbidity Symptoms are Monitored and Maintained or Improved:   Monitor and assess patient's chronic conditions and comorbid symptoms for stability, deterioration, or improvement   Collaborate with multidisciplinary team to address chronic and comorbid conditions and prevent exacerbation or deterioration   Update acute care plan with appropriate goals if chronic or comorbid symptoms are exacerbated and prevent overall improvement and discharge     Problem: Pain  Goal: Verbalizes/displays adequate comfort level or baseline comfort level  5/27/2025 2110 by Manjula Bagley RN  Outcome: Progressing  Flowsheets (Taken 5/27/2025 2110)  Verbalizes/displays adequate comfort level or baseline comfort level:   Encourage patient to monitor pain and request assistance   Assess pain using appropriate pain scale   Administer analgesics based on type and severity of pain and evaluate response   Notify Licensed Independent Practitioner if interventions unsuccessful or patient reports new pain   Implement non-pharmacological measures as appropriate and evaluate response

## 2025-05-28 NOTE — PROGRESS NOTES
Shift assessment completed. Patient is A&O x4.  VSS.  Denies Pain.  IV site patent/ flushed, and infusing. Patient on RA. Patient's last BM- 5/27/25. Tolerating clear liquid diet without increase pain/nausea/vomiting. Patient independent in room.  Medication given per MAR.     Safety Measures in place:   Denies any needs at this time.   Bed locked and in lowest position.    Call light within reach.   Gripper socks applied.   Patient in stable condition when RN leaving room.

## 2025-05-28 NOTE — PLAN OF CARE
Problem: Safety - Adult  Goal: Free from fall injury  5/28/2025 0807 by Rayray Dawson RN  Outcome: Progressing  5/27/2025 2110 by Manjula Bagley RN  Outcome: Progressing  Flowsheets (Taken 5/27/2025 2110)  Free From Fall Injury: Instruct family/caregiver on patient safety     Problem: Chronic Conditions and Co-morbidities  Goal: Patient's chronic conditions and co-morbidity symptoms are monitored and maintained or improved  5/28/2025 0807 by Rayray Dawson, RN  Outcome: Progressing  5/27/2025 2110 by Manjula Bagley RN  Outcome: Progressing  Flowsheets (Taken 5/27/2025 2110)  Care Plan - Patient's Chronic Conditions and Co-Morbidity Symptoms are Monitored and Maintained or Improved:   Monitor and assess patient's chronic conditions and comorbid symptoms for stability, deterioration, or improvement   Collaborate with multidisciplinary team to address chronic and comorbid conditions and prevent exacerbation or deterioration   Update acute care plan with appropriate goals if chronic or comorbid symptoms are exacerbated and prevent overall improvement and discharge

## 2025-05-28 NOTE — CONSULTS
Inpatient Note    CC: abdominal pain/N/V  Summary:   Zachery Gómez is being seen by nephrology for CKD-IIIb. He is a 79 y.o. male with a PMH significant for hypertenison, pre-diabetes, hypothyroidism, hyperlipidemia who presented to the hospital 5/24/2025 with N/V and abdominal pain. He was noted to have an ileus. He is being managed non-operatively. He requested a nephrology consult due to CKD.    Interval History  Seen at bedside  Blood pressure is overall stable 141/67 this morning  Chlorthalidone is on hold  Got a lot of potassium yesterday  Also treated for hypophosphatemia  He was on chlorthalidone outpatient and was tolerating very well  Now low potassium likely due to ileus      Plan:   Okay to go back to his regular medication including valsartan and chlorthalidone  He generally tolerates all this medication and follow-up labs very stable in the past  Asked to hold medication if he has nausea vomiting etc again    Will follow-up BMP in a week to which he is agreeable to do/placed orders in the [facility] system    Assessment:   CKD-IIIb:  - baseline Cr 1.6-1.9  - follows with also for management of CKD  - 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  - on farxiga and valsartan for end-organ protection  - renal function stable.    Hypokalemia/Hypophosphatemia  - likely due to GI losses  - replete.    Hypertension:  - home regimen: valsartan 320 mg daily, spironolactone 25 mg daily, nifedipine 30 mg BID, chlorthalidone 25 mg daily.    Ileus:  - per primary/general surgery.      Shyam Roberts MD  [facility] Nephrology  Office: [phone]          PE:   Vitals:    05/28/25 0801   BP: (!) 141/67   Pulse: 60   Resp: 18   Temp: 97.7 °F (36.5 °C)   SpO2: 96%       General appearance: in NAD  Respiratory: Respiratory effort appears normal, bilateral equal chest rise, no wheeze, no crackles  Cardiovascular: Ausculation shows RRR no edema  Abdomen:

## 2025-05-28 NOTE — PROGRESS NOTES
Assessment completed and documented. VSS. A/ox4. Denies pain. Ambulates independently. Takes pills whole with water. Tolerating clears. States he is hungry today. Bed locked and in lowest position. Bedside table and call light within reach. Denies further needs at this time.    Patient tolerated late breakfast. Ambulating halls without difficulty. Waiting for nephrology to let this RN discharge patient.   Rash

## 2025-05-28 NOTE — DISCHARGE SUMMARY
medications were held at that time.  He was given IV hydration of normal saline at 75 mL/h.  His diet was advanced as tolerated and gradually worked up to a regular diet.  He was encouraged to ambulate during this time.  Repeat abdominal x-ray on 5/27 continues to show diffusely dilated loops of small bowel without significant change.  Given the patient's overall improvement clinically, it was determined that this dilation could be chronic given his prior history of a subtotal colectomy.  During the patient's admission his hypertension was monitored and he was given the nadolol as needed when his blood pressure was greater than 160.  The patient follows up with nephrology on an outpatient basis who wished to keep his blood pressure less than 135 SBP.  Per patient request nephrology was consulted.  Patient's blood pressure medications were slowly restarted as he was able to tolerate oral intake.  At his discharge he should continue to follow with nephrology and resume his other oral medications.  The patient was also found to have hypokalemia which nephrology helped to correct during his stay with a goal of 4.0-4.5.     Assessment/Plan:       Ileus  - History of total colectomy in 2018  - General Surgery consulted  - IV PPI as well as IV Zofran/Compazine for symptomatic management  - Diet advanced as tolerated  - Encouraged to ambulate during admission  - Continue daily aspirin at DC     Hypertension  - Labetalol given as needed for blood pressure greater than 160 during hospital admission  - Nephrology consulted per patient request  - Spironolactone 25 mg restarted this admission, continue at DC  - Chlorthalidone held this admission due to hypokalemia, patient may restart at DC but should continue to follow with nephrology for his potassium  - Patient should resume valsartan at DC     Hypokalemia  - Nephrology consulted per patient request  - Enteric KCl 40 mEq twice daily along with IV KCl 40 mEq given on 5/27  -  the last 72 hours.  No results for input(s): \"AST\", \"ALT\", \"BILIDIR\", \"BILITOT\", \"ALKPHOS\" in the last 72 hours.  No results for input(s): \"INR\", \"LACTA\", \"TSH\" in the last 72 hours.    Urine Cultures: No results found for: \"LABURIN\"  Blood Cultures: No results found for: \"BC\"  No results found for: \"BLOODCULT2\"  Organism: No results found for: \"ORG\"    Signed:    Adriano Rodriguez DO

## 2025-05-28 NOTE — DISCHARGE INSTRUCTIONS
Please do BMP only/ordered in the crisis time to be done in a week  Continue current medications/which we will add on before

## 2025-05-28 NOTE — PROGRESS NOTES
[facility]    PATIENT NAME: Zachery Gómez     TODAY'S DATE: 5/28/2025    CHIEF COMPLAINT: none    INTERVAL HISTORY/HPI:    Pt without pain or nausea, bms back to his normal     OBJECTIVE:  VITALS:  BP (!) 141/67   Pulse 60   Temp 97.7 °F (36.5 °C) (Oral)   Resp 18   Ht 1.829 m (6')   Wt 93.9 kg (207 lb)   SpO2 96%   BMI 28.07 kg/m²     INTAKE/OUTPUT:    I/O last 3 completed shifts:  In: 580 [P.O.:580]  Out: -   No intake/output data recorded.    CONSTITUTIONAL:  awake and alert  LUNGS:  no crackles or wheezing  ABDOMEN:   soft, non-distended, non-tender     Data:  CBC:   Recent Labs     05/26/25  0519 05/27/25  0612   WBC 4.0 4.2   HGB 12.3* 11.3*   HCT 38.1* 34.1*    199     BMP:    Recent Labs     05/26/25  0519 05/27/25  0612    145   K 3.3* 3.3*    112*   CO2 21 17*   BUN 40* 37*   CREATININE 1.7* 1.6*   GLUCOSE 130* 97     Hepatic: No results for input(s): \"AST\", \"ALT\", \"BILITOT\", \"ALKPHOS\" in the last 72 hours.    Invalid input(s): \"ALB\"  Mag:      Recent Labs     05/26/25  0519 05/27/25  0612   MG 2.08 2.04      Phos:     Recent Labs     05/27/25  0612 05/27/25  1329   PHOS 1.9* 2.7      INR: No results for input(s): \"INR\" in the last 72 hours.    Radiology Review:    NA  ASSESSMENT AND PLAN:  SBO with history of subtotal colectomy in 2018    Regular diet  If tolerates then ok to discharge this afternoon    Electronically signed by Davis Bernard MD       SURGERY STAFF

## 2025-05-28 NOTE — CARE COORDINATION
D/c order noted. Pending tolerance of increased diet and nephrology ok. Spoke with patient and wife. No concerns for d/c. Denied needs. Patient independent in room. Taurus Aviles RN  IMM given. Follow-Up copy of Important Message from Medicare (IMM2) has been explained to patient and/or designated healthcare decision maker (HCDM). Pt and/or HCDM aware that patient is permitted to stay an additional 4 hours prior to discharge to consider an appeal if they feel as though they are being discharged too soon. Patient may discharge as planned if chooses to do so.  Patient/HCDM voice no other concerns or questions regarding this process.

## 2025-05-28 NOTE — PROGRESS NOTES
Discharge education provided both verbally and written, patient verbalized understanding, denies questions. PIV removed without complications. Patient left with all belongings including phone and . VSS. Patient left via private vehicle with wife.

## 2025-05-29 NOTE — PROGRESS NOTES
Physician Progress Note      PATIENT:               PHUC HO  CSN #:                  [id number]  :                       1945  ADMIT DATE:       2025 7:05 AM  DISCH DATE:        2025 2:37 PM  RESPONDING  PROVIDER #:        RADHA CHATMAN          QUERY TEXT:    Please clarify the following documentation: \" Repeat abdominal x-ray on    continues to show diffusely dilated loops of small bowel without significant   change.  Given the patient's overall improvement clinically, it was determined   that this dilation could be chronic given his prior history of a subtotal   colectomy\"    per ROSE Mcgregor    The clinical indicators include:  PMHx significant for type 2 diabetes, hyperlipidemia, hypertension, as well as   a colectomy due to diverticulitis back in 2018.    Patient was admitted to the hospital due to an ileus. ED work up did reveal CT   scan indication Stable mildly dilated small bowel loops in the anterior   abdomen with fecalized small bowel contents. Distal small bowel is   decompressed-He had colectomy in 2018    tx-Continue NPO and IVFs- General Surgery consulted  Options provided:  -- A Postoperative ileus suspected associated with 2018 Colectomy  -- Ileus is not related to the 2018 colectomy procedure, but is related to   other incidental risk factor, Please specify other incidental risk factor.  -- Other - I will add my own diagnosis  -- Disagree - Not applicable / Not valid  -- Disagree - Clinically unable to determine / Unknown  -- Refer to Clinical Documentation Reviewer    PROVIDER RESPONSE TEXT:    Patient has a Postoperative ileus suspected associated with 2018 Colectomy    Query created by: Juvenal Hinson on 2025 12:15 PM      Electronically signed by:  RADHA CHATMAN 2025 1:08 PM